# Patient Record
Sex: MALE | Race: WHITE | Employment: UNEMPLOYED | URBAN - NONMETROPOLITAN AREA
[De-identification: names, ages, dates, MRNs, and addresses within clinical notes are randomized per-mention and may not be internally consistent; named-entity substitution may affect disease eponyms.]

---

## 2023-02-26 ENCOUNTER — HOSPITAL ENCOUNTER (INPATIENT)
Age: 23
LOS: 4 days | Discharge: HOME | End: 2023-03-02
Attending: PSYCHIATRY & NEUROLOGY | Admitting: PSYCHIATRY & NEUROLOGY
Payer: MEDICAID

## 2023-02-26 PROBLEM — F33.3 MDD (MAJOR DEPRESSIVE DISORDER), RECURRENT, SEVERE, WITH PSYCHOSIS (HCC): Status: ACTIVE | Noted: 2023-02-26

## 2023-02-26 PROCEDURE — 6370000000 HC RX 637 (ALT 250 FOR IP): Performed by: PSYCHIATRY & NEUROLOGY

## 2023-02-26 PROCEDURE — 1240000000 HC EMOTIONAL WELLNESS R&B

## 2023-02-26 RX ORDER — RISPERIDONE 1 MG/1
1 TABLET ORAL 2 TIMES DAILY
Status: DISCONTINUED | OUTPATIENT
Start: 2023-02-26 | End: 2023-02-27

## 2023-02-26 RX ORDER — MAGNESIUM HYDROXIDE/ALUMINUM HYDROXICE/SIMETHICONE 120; 1200; 1200 MG/30ML; MG/30ML; MG/30ML
30 SUSPENSION ORAL EVERY 6 HOURS PRN
Status: DISCONTINUED | OUTPATIENT
Start: 2023-02-26 | End: 2023-03-02 | Stop reason: HOSPADM

## 2023-02-26 RX ORDER — HYDROXYZINE HYDROCHLORIDE 25 MG/1
50 TABLET, FILM COATED ORAL 3 TIMES DAILY PRN
Status: DISCONTINUED | OUTPATIENT
Start: 2023-02-26 | End: 2023-03-02 | Stop reason: HOSPADM

## 2023-02-26 RX ORDER — TRAZODONE HYDROCHLORIDE 50 MG/1
50 TABLET ORAL NIGHTLY PRN
Status: DISCONTINUED | OUTPATIENT
Start: 2023-02-26 | End: 2023-03-02 | Stop reason: HOSPADM

## 2023-02-26 RX ORDER — IBUPROFEN 400 MG/1
400 TABLET ORAL EVERY 6 HOURS PRN
Status: DISCONTINUED | OUTPATIENT
Start: 2023-02-26 | End: 2023-03-02 | Stop reason: HOSPADM

## 2023-02-26 RX ORDER — ACETAMINOPHEN 325 MG/1
650 TABLET ORAL EVERY 4 HOURS PRN
Status: DISCONTINUED | OUTPATIENT
Start: 2023-02-26 | End: 2023-03-02 | Stop reason: HOSPADM

## 2023-02-26 RX ORDER — VENLAFAXINE HYDROCHLORIDE 37.5 MG/1
37.5 CAPSULE, EXTENDED RELEASE ORAL
Status: DISCONTINUED | OUTPATIENT
Start: 2023-02-27 | End: 2023-03-02 | Stop reason: HOSPADM

## 2023-02-26 RX ORDER — PRAZOSIN HYDROCHLORIDE 1 MG/1
2 CAPSULE ORAL NIGHTLY
Status: DISCONTINUED | OUTPATIENT
Start: 2023-02-26 | End: 2023-03-02 | Stop reason: HOSPADM

## 2023-02-26 RX ADMIN — RISPERIDONE 1 MG: 1 TABLET ORAL at 14:56

## 2023-02-26 RX ADMIN — HYDROXYZINE HYDROCHLORIDE 50 MG: 25 TABLET ORAL at 14:56

## 2023-02-26 ASSESSMENT — PATIENT HEALTH QUESTIONNAIRE - PHQ9: SUM OF ALL RESPONSES TO PHQ QUESTIONS 1-9: 18

## 2023-02-26 ASSESSMENT — SLEEP AND FATIGUE QUESTIONNAIRES
DO YOU USE A SLEEP AID: NO
AVERAGE NUMBER OF SLEEP HOURS: 2
SLEEP PATTERN: DIFFICULTY FALLING ASLEEP;DISTURBED/INTERRUPTED SLEEP;INSOMNIA;NIGHTMARES/TERRORS
DO YOU HAVE DIFFICULTY SLEEPING: YES

## 2023-02-26 ASSESSMENT — PAIN SCALES - GENERAL
PAINLEVEL_OUTOF10: 0
PAINLEVEL_OUTOF10: 0

## 2023-02-26 ASSESSMENT — PAIN - FUNCTIONAL ASSESSMENT: PAIN_FUNCTIONAL_ASSESSMENT: ACTIVITIES ARE NOT PREVENTED

## 2023-02-26 ASSESSMENT — LIFESTYLE VARIABLES
HOW OFTEN DO YOU HAVE A DRINK CONTAINING ALCOHOL: NEVER
HOW MANY STANDARD DRINKS CONTAINING ALCOHOL DO YOU HAVE ON A TYPICAL DAY: PATIENT DOES NOT DRINK

## 2023-02-26 NOTE — PROGRESS NOTES
Behavioral Health   Admission Note   Admission Type: Voluntary    Reason for Admission: \"Increased depression and suicidal ideation. Abusing Cough Syryp at home. Panicked, Paranoidl, Germaphobe. \"    Patient Strengths/Barriers  Strengths (Must Choose Two): Motivation level for treatment, Sense of humor  Barriers: Education, Recreational/leisure/hobbies, Technical/vocation    Addictive Behavior  In the Past 3 Months, Have You Felt or Has Someone Told You That You Have a Problem With  : Other (comment) (Abusing OTC per patient)    Medical Problems:   No past medical history on file. Status EXAM:  Mental Status and Behavioral Exam  Normal: No  Level of Assistance: Independent/Self  Facial Expression: Flat, Sad, Worried  Affect: Congruent  Level of Consciousness: Alert  Frequency of Checks: 4 times per hour, close  Mood:Normal: No  Mood: Anxious, Labile, Sad, Worthless, low self-esteem, Terrified  Motor Activity:Normal: Yes  Motor Activity: Other (comment) (WNL)  Eye Contact: Fair  Observed Behavior: Cooperative, Friendly  Sexual Misconduct History: Current - no  Preception: [place] to person, [place] to time, [place] to place, [place] to situation  Attention:Normal: No  Attention: Hyperalert  Thought Processes: Circumstantial  Thought Content:Normal: No  Thought Content: Paranoia  Depression Symptoms: Crying, Change in energy level, Feelings of hopelessess, Feelings of worthlessness, Feelings of helplessness, Impaired concentration, Increased irritability, Isolative, Loss of interest, Sleep disturbance  Anxiety Symptoms: Generalized, Panic attack  Joy Symptoms: Flight of ideas, Increased energy, Labile, Less need to sleep, Poor judgment, Rapid cycling  Hallucinations: Auditory (comment), Visual (comment), Olfactory (comment) (See\"Re Germs\", Smells Urine, Sees animal shapes, Voices that \" are not his own and noone he knows telling him how to feel or what other people are feeling. \")  Delusions: Yes  Delusions: Paranoid  Memory:Normal: No  Memory: Poor recent, Confabulation  Insight and Judgment: No  Insight and Judgment: Poor judgment, Poor insight    Pt admitted with followings belongings:  Dental Appliances: None  Vision - Corrective Lenses: None  Hearing Aid: None  Jewelry: None  Body Piercings Removed: N/A  Clothing: Shirt, Undergarments, Socks, Pants, Shorts, Footwear  Other Valuables: Other (Comment), Wallet     Admission order obtained Yes  Belongings sent home with Na. Valuables placed in safe in security envelope, number:  na. Patient's home medications were na. Patient oriented to surroundings and program expectations and copy of patient rights given. Received admission packet:  Yes  Consents reviewed, signed Yes. Outcomes Questionnaire completed Yes. \"An Important Message from [place] About Your Rights\" form reviewed, signed: N/A . Patient verbalize understanding: Yes. Patient informed of 15 minute safety monitoring: YES/NO/NA: yes          Patient screened positive for suicide risk on CSSR-S (\"yes\" to question #4, 5, OR 6)  na. Physician notified of risk score na  Constant Observer Orders received N/A .   2 person skin assessment completed upon admission Refused. Explained patients right to have family, representative or physician notified of their admission. Patient has Declined for physician to be notified. Patient has Declined for family/representative to be notified. Provided pt with [employer] handout entitled \"Quitting Smoking. \"  Reviewed handout with pt addressing dangers of smoking, developing coping skills, and providing basic information about quitting. Pt response to counseling:  Refused. Admission summary: Patient admitted to  directly. Patient was transferred to  from [facility]. Patient reports increasing suicidal and homicidal ideation over the last few weeks. Patient reports paranoia and abusing otc cough syrup \"to make the red germs go away. \" Patient reports auditory, visual and olfactory hallucinations. Patient reports that he hears voices telling him how people are feelings and what to feel himself. Patient reports he sees \"red germs\"  and that he smells urine of unspecified type all the time due to fear of the home he lives at being dirty. Patient is FTM transgender. Patient reports being taken out of school in the first grade and having difficulty adjusting to the world due to his limited knowledge base. Patient reports being diagnosed HIV positive in 2017 and not being followed or taking medication due to lack of insurance.            Dev Garcia RN

## 2023-02-26 NOTE — H&P
Department of Psychiatry  Attending History and Physical - Adult         CHIEF COMPLAINT:  Suicidal ideation and worsening psychosis    History obtained from:  patient    HISTORY OF PRESENT ILLNESS:          The patient is a 23 y.o. adult with significant past medical history of PTSD and depression who presents with worsening depression, anxiety attacks, paranoia and active suicidal ideation. Has been struggling with Chronic PTSD symptoms isnce young age. He took prozac and wellbutrin in past. Doneen Dalton off his medications    PSYCHIATRIC HISTORY:      The patient is currently receiving care for the above psychiatric illness. Past mental health outpatient care includes:  1-5 treatment centers    Past mental health hospitalizations: 1 admission    Lifetime Psychiatric Review of Systems         Joy or Hypomania:  no     Panic Attacks:  yes -      Phobias:  yes -      Obsessions and Compulsions:  no     Body or Vocal Tics:  no     Hallucinations:  yes -      Delusions:  yes -     Past psychiatric medications include:  Prpzac and wellbutrin    Adverse reactions from psychotropic medications:  none    Past Medical History:    No past medical history on file. Past Surgical History:    No past surgical history on file. Medications Prior to Admission:   No medications prior to admission. Allergies:  Patient has no allergy information on record. Family History:   No family history on file. Psychiatric Family History  No family history    REVIEW OF SYSTEMS:  CONSTITUTIONAL:  negative  EYES:  negative  HEENT:  negative  RESPIRATORY:  negative  CARDIOVASCULAR:  negative  GASTROINTESTINAL:  negative  GENITOURINARY:  negative  INTEGUMENT/BREAST:  negative  HEMATOLOGIC/LYMPHATIC:  negative    PHYSICAL EXAM:    Vitals: There were no vitals taken for this visit.     Mental Status Examination:  Level of consciousness:  within normal limits  Appearance:  well-appearing  Behavior/Motor:  psychomotor retardation  Attitude toward examiner:  cooperative and attentive  Speech:  slow  Mood:  depressed  Affect:  mood congruent  Thought processes:  linear and goal directed  Thought content:  Homocidal ideation denies  Suicidal Ideation:  active  Delusions:  paranoid  Perceptual Disturbance:  auditory  Cognition:  oriented to person, place, and time  Concentration succeeded  Memory intact  Insight:  impaired  Judgment:  impaired    Cranial Nerve Exam II-XII intact    DSM-IV DIAGNOSIS:    Impression    (Axis I):     PTSD with Psychotic feature   Major depressive disorder; recurrent, severe, and with psychotic features    ASSESSMENT AND PLAN:        Disposition:    Patient to be admitted to the hospital.    Reason for Admission to Psychiatric Unit:  Threat to self requiring 24 hour professional observation    The patient requires intensive 24 level of care for the following reasons:  the need for patient safety    Estimated length of stay:  5-7 days    INITIAL TREATMENT PLAN    Risk Management:  close watch    Medications: Will start Effexor XR 37.5 mg po qdaily, prazosin 5 mg po qhs and risperdal 1 mg po BID    Psychotherapy:  participation in milieu and group      GENERAL PATIENT/FAMILY EDUCATION    Goal:      Patient will understand basic signs and symptoms, Patient will understand benefits/risks and potential side effects from proposed meds, and Patient will understand their role in recovery    Plan:      Verbal explanation of patient's clinical problems in a manner consistent with patient's ability and readiness to except diagnosis. Verbal explanation of working diagnosis, signs and symptoms and recommended course of treatment including risk/benefit information has been given to patient.       Behavioral Services  Medicare Certification Upon Admission     I certify that this patient's inpatient psychiatric hospital admission is medically necessary for:   X (1) Treatment which could reasonably be expected to improve this patient's condition,       X (2) Or for diagnostic study;      AND     X (2) The inpatient psychiatric services are provided while the individual is under the care of a physician and are included in the individualized plan of care. Estimated length of stay/service: Greater than two midnights will be required to reach therapeutic levels of medications and to stabilize mood     Plan for post-hospital care:  Follow up with outpatient psychiatric services        More than 50% of time spent in counseling and care coordination     Time spent 55 minutes

## 2023-02-26 NOTE — GROUP NOTE
Group Therapy Note    Date: 2/26/2023    Group Start Time: 1330  Group End Time: 2697  Group Topic: Healthy Living/Wellness    [facility] Adult Psych 4E    Kizzy Vega LPN        Group Therapy Note    Attendees: 7       Notes:  did not attend    Discipline Responsible: Licensed Practical Nurse      Signature:  Kizzy Vega LPN

## 2023-02-26 NOTE — BH NOTE
INPATIENT RECREATIONAL THERAPY  ADULT BEHAVIORAL SERVICES  EVALUATION    REFERRING PHYSICIAN:  Dr. Oni Escobedo   DIAGNOSIS:   PTSD with psychotic features. PRECAUTIONS:  Standard Precautions   HISTORY OF PRESENT ILLNESS/INJURY: Pt is transgender (female transitioning to male). Pt is admitted to the unit from [facility]. Pt reports increased suicidal and homicidal ideations over the last few weeks and increased paranoia. Pt reports that he has been abusing over the counter cough medicine to make \"red germs go away\" and reports increased hallucinations including hearing voices that tell him how people are feeling and what to feel. He also reports smelling urine most of the time due to fear of living in a dirty home. Per Charmayne Divine RN, pt stated that he had his water shut off over a month ago. Pt lives with a lot of people in his home. Pt has a long hx of PTSD, depression and anxiety. Pt has not been mediation compliant prior to this admission. PMH:  Please see medical chart for prior medical history, allergies, and medication    HISTORY OF PSYCHIATRIC TREATMENT: Pt has a hx of inpatient psychiatric admissions but has not been admitted here on 4E. Pt is currently not linked with an outpatient provider. YOB: 2000  GENDER:  Pt is transgender (female transitioning to male). MARITAL STATUS:  Single- in a relationship  EMPLOYMENT STATUS:  Unemployed   LIVING SITUATION/SUPPORT:  Lives with boyfriend and boyfriends family  EDUCATIONAL LEVEL: Left school in first grade- pulled to be home schooled by mom but never was. MEDICATION/DRUG USE: Pt denies tobacco use, alcohol use, and illicit drug use. Pt has not been medication compliant prior to this admission.      LEISURE INTERESTS:    ACTIVITY PREFERENCE:   ACTIVITY TYPES:    COGNITION: A&Ox4    COPING: Poor   ATTENTION: Fair   RELAXATION: Poor   SELF-ESTEEM: Poor   MOTIVATION:  Fair    SOCIAL SKILLS:  Fair- pt very paranoid at this time   FRUSTRATION TOLERANCE:  No documented hx of violence at this time   ATTENTION SEEKING: No attention seeking behaviors observed at this time   COOPERATION: Pt cooperative   AFFECT: Blunt  APPEARANCE: Pt displays fair grooming and hygiene and is currently dressed in hospital scrub attire    HEARING:  No impairments noted at this time  VISION:   No impairments noted at this time   VERBAL COMMUNICATION:  No impairments noted at this time   WRITTEN COMMUNICATION:  Pt has limited education    COORDINATION:  No impairments noted at this time   MOBILITY:  Pt ambulates independently    GOALS: Pt to increase socialization and knowledge of coping skills through participation in group therapy sessions following verbal encouragement from staff.

## 2023-02-26 NOTE — PROGRESS NOTES
02/26/23 1550   Encounter Summary   Encounter Overview/Reason  Behavioral Health   Service Provided For: Patient   Referral/Consult From: Other (comment)  (Spirituality Group)   Last Encounter  02/26/23   Complexity of Encounter Low   Begin Time 1515   End Time  1545   Total Time Calculated 30 min   Behavioral Health    Type  Spirituality Group     PatientNamrata" attended and participated in spirituality group with  and other patients. [address] [place] [address]. [address] [place] [address] Rene Dawson, [address]  [phone]

## 2023-02-27 PROCEDURE — APPSS30 APP SPLIT SHARED TIME 16-30 MINUTES: Performed by: PHYSICIAN ASSISTANT

## 2023-02-27 PROCEDURE — 1240000000 HC EMOTIONAL WELLNESS R&B

## 2023-02-27 PROCEDURE — 6370000000 HC RX 637 (ALT 250 FOR IP): Performed by: PHYSICIAN ASSISTANT

## 2023-02-27 PROCEDURE — 6370000000 HC RX 637 (ALT 250 FOR IP): Performed by: PSYCHIATRY & NEUROLOGY

## 2023-02-27 RX ADMIN — VENLAFAXINE HYDROCHLORIDE 37.5 MG: 37.5 CAPSULE, EXTENDED RELEASE ORAL at 08:35

## 2023-02-27 RX ADMIN — HYDROXYZINE HYDROCHLORIDE 50 MG: 25 TABLET ORAL at 21:22

## 2023-02-27 RX ADMIN — PRAZOSIN HYDROCHLORIDE 2 MG: 1 CAPSULE ORAL at 21:22

## 2023-02-27 RX ADMIN — TRAZODONE HYDROCHLORIDE 50 MG: 50 TABLET ORAL at 21:22

## 2023-02-27 RX ADMIN — HYDROXYZINE HYDROCHLORIDE 50 MG: 25 TABLET ORAL at 08:35

## 2023-02-27 RX ADMIN — HYDROXYZINE HYDROCHLORIDE 50 MG: 25 TABLET ORAL at 13:25

## 2023-02-27 RX ADMIN — RISPERIDONE 1.5 MG: 0.25 TABLET, FILM COATED ORAL at 21:21

## 2023-02-27 RX ADMIN — RISPERIDONE 1 MG: 1 TABLET ORAL at 08:35

## 2023-02-27 ASSESSMENT — PAIN SCALES - GENERAL
PAINLEVEL_OUTOF10: 0
PAINLEVEL_OUTOF10: 0

## 2023-02-27 ASSESSMENT — PAIN - FUNCTIONAL ASSESSMENT: PAIN_FUNCTIONAL_ASSESSMENT: ACTIVITIES ARE NOT PREVENTED

## 2023-02-27 NOTE — PROGRESS NOTES
Department of Psychiatry  Progress Note     Chief Complaint:  suicidal ideation, homicidal ideation, psychosis     PROGRESS:  [address] Emiliano Renny, who prefers to be called Shakeel Finn, presents to the interview room. He states he is doing okay today. Reports he was admitted due to paranoia and intrusive thoughts of hurting people and himself. He states the suicidal or homicidal thoughts have been hard to control lately. He says he feels so powerless to his mental health. He also reports he has been seeing and hearing things. The voices give him commands to feel a certain way about himself and other people. He also reports the voices have been giving him commands to harm himself or others. He states the voices have basically been telling him \"hurt that person before they hurt you. \"  He reports he does not want to hurt anyone. He specifically mentions his fiancée, cousin in law and sister-in-law. He is not sure where these homicidal thoughts are coming from. States he just pop into his head. He states he was having suicidal thoughts because \"if I am dead I cannot hurt anyone I care about. \"  He also reported that he was feeling very paranoid at home. States he lives with a 77year old grandmother and was paranoid that she was going to attack him. Shakeel Finn reports his mood is okay today. He rates his mood 7 out of 10 with 10 being the best.  He continues to feel depressed and anxious. He is tearful throughout the interview. He endorses passive suicidal thoughts but denies any specific plan or intent at this time. He has had a contract for safety on the unit. He denies any active homicidal ideation. He does continue to feel paranoid. He states he knows the nurses are nice and care about him but he feels that they are trying to hurt him. He reports auditory hallucinations are less frequent. He feels the medication has been helping with this.   He states after he takes his medication the feeling that everyone is out to get him goes away. He denies any command hallucinations to harm himself or others today. He does endorse having visual hallucinations of \"sparkling creatures. \"  He described it to his nurse Ember Colunga this morning as like the long sparklers people use. He states at times, he cannot see them but he \"knows they are around the corner. \"  He slept really good last night. Staff reported he slept 8.5 hours continuous. Appetite has been good. He has been compliant with his medications and denies any side effects. He has been out the unit coloring, interacting with peers and attending groups. Suicidal ideations: Passive without current plan or intent   compliance with medications: good   Medication side effects: absent  ROS: Patient has new complaints:  no  Sleep quality: 8.5 hours continuous last night per staff  Attending groups: yes      OBJECTIVE      Medications  Current Facility-Administered Medications: acetaminophen (TYLENOL) tablet 650 mg, 650 mg, Oral, Q4H PRN  ibuprofen (ADVIL;MOTRIN) tablet 400 mg, 400 mg, Oral, Q6H PRN  magnesium hydroxide (MILK OF MAGNESIA) 400 MG/5ML suspension 30 mL, 30 mL, Oral, Daily PRN  aluminum & magnesium hydroxide-simethicone (MAALOX) 200-200-20 MG/5ML suspension 30 mL, 30 mL, Oral, Q6H PRN  hydrOXYzine HCl (ATARAX) tablet 50 mg, 50 mg, Oral, TID PRN  traZODone (DESYREL) tablet 50 mg, 50 mg, Oral, Nightly PRN  risperiDONE (RISPERDAL) tablet 1 mg, 1 mg, Oral, BID  prazosin (MINIPRESS) capsule 2 mg, 2 mg, Oral, Nightly  venlafaxine (EFFEXOR XR) extended release capsule 37.5 mg, 37.5 mg, Oral, Daily with breakfast     Physical     height is 5' 6\" (1.676 m) and weight is 110 lb (49.9 kg). His oral temperature is 98.6 °F (37 °C). His blood pressure is 121/74 and his pulse is 86. His respiration is 16 and oxygen saturation is 100%.    No results found for: WBC, HGB, HCT, PLT, CHOL, TRIG, HDL, LDLDIRECT, ALT, AST, NA, K, CL, CREATININE, BUN, CO2, TSH, PSA, INR, GLUF, LABA1C, LABMICR Mental Status Exam:   Level of consciousness:  awake  Appearance:  well-appearing, hospital attire, in chair, good grooming, and good hygiene  Behavior/Motor: Tearful at times  Attitude toward examiner:  cooperative, attentive, and good eye contact  Speech:  spontaneous, normal rate, and normal volume  Mood: Dysphoric  Affect:  blunted  Thought processes:  linear, goal directed, and coherent  Thought content:  Denies homicidal ideation  Suicidal Ideation:  passive, without plan, and without intent  Delusions:  paranoid and persecutory  Perceptual Disturbance:  auditory noncommand. Visual hallucinations of \"sparkling creatures\"  Cognition: Patient is oriented to person, place, time and situation  Concentration: clinically adequate  Memory: intact  Insight & Judgement: fair       ASSESSMENT    Major depressive disorder, recurrent, severe with psychotic features  Anxiety unspecified  PTSD per history    PLAN    Patient's symptoms show minimal improvement today  Medication adjustments as discussed with the attending physician: Continue to titrate Risperdal  Side effects and risks versus benefits of medications were discussed with the patient   Attempt to develop insight, psycho-education and supportive therapy conducted. Probable discharge: To be determined  Follow-up: [facility] outpatient, daily while on inpatient unit    Electronically signed by Alexandria Del Real PA-C on 2/27/2023 at 1:02 PM Reviewed patient's current plan of care and vital signs with nursing staff. **This report has been created using voice recognition software. It may contain minor errors which are inherent in voice recognition technology. **                                        Psychiatry Attending Attestation     I assessed this patient and reviewed the case and plan of care with Alexandria Del Real PA-C. I have reviewed the above documentation and I agree with the findings and treatment plan with the following updates.   Michelle Ly reports that he continues to feel sad down and low. Reports that he is fearful of hurting his friends and family members before they can hurt him. Mentions that he has constant thoughts that people will hurt him and is also having some commanding voices. Reports that these voices are very infrequent however they continue to be intense when he hears them. Reports feeling sad down and low. Reports that suicidal thoughts still cross his mind. Tolerating medications well and notes that Risperdal has been helpful with his paranoia and voices. Discussed with him about continue to titrate Risperdal and he is agreeable to the plan. PLAN  Patient s symptoms   are improving  Will continue to tittrate Risperdal  Attempt to develop insight  Psycho-education conducted. Supportive Therapy conducted. Probable discharge is tbd  Follow-up tbd    More than 16 mins of the session was spent doing Supportive psychotherapy and coordinating care. Session lasted for over 30 mins. Patient was evaluated by Nina Hall PA-C on the unit in person and I evaluated patient as Tele visit. This Virtual Visit was conducted with patient's consent. The patient is located in a state where I am licensed to provide care. Be Wright is a 23 y.o. adult being evaluated by a Virtual Visit (video visit) encounter to address concerns as mentioned above. A caregiver was present in the room along with the patient. Patient is present at [address] [place] [address] and I am physically present at my home in [facility]     --Arabella Hanks MD on 2/27/2023 at 2:25 PM    An electronic signature was used to authenticate this note. **This report has been created using voice recognition software. It may contain minor errors which are inherent in voice recognition technology. **

## 2023-02-27 NOTE — PROGRESS NOTES
Discharge planning-Darin is to go to [facility] for a walk in intake. Check in times are on Monday and Wednesday morning at 0830 am. It is a first come first serve time slot.

## 2023-02-27 NOTE — PATIENT CARE CONFERENCE
[facility]  Initial Interdisciplinary Treatment Plan NOTE    REVIEW DATE AND TIME: 2/27/23 9:30    PATIENT was IN TREATMENT TEAM.  See Multidisciplinary Treatment Team sheet for participants. ADMISSION TYPE:   Admission Type: Voluntary    REASON FOR ADMISSION:  Reason for Admission: \"Increased depression and suicidal ideation. Abusing Cough Syryp at home. Panicked, Paranoidl, Germaphobe. \"      Estimated Length of Stay Update:  3-5 days  Estimated Discharge Date Update: 3-5 days    Patient Strengths/Barriers  Strengths (Must Choose Two): Motivation level for treatment, Sense of humor  Barriers: Education, Recreational/leisure/hobbies, Technical/vocation  Addictive Behavior:Addictive Behavior  In the Past 3 Months, Have You Felt or Has Someone Told You That You Have a Problem With  : Other (comment) (Abusing OTC per patient)  Medical Problems:No past medical history on file. EDUCATION:   Learner Progress Toward Treatment Goals: Reviewed results and recommendations of this team, Reviewed group plan and strategies, Reviewed signs, symptoms and risk of self harm and violent behavior, and Reviewed goals and plan of care    Method: Individual    Outcome: Verbalized understanding and Demonstrated Understanding    PATIENT GOALS: Get on the right medications     OQ TOP QUALITY PRIORITIES FOR THE PATIENT AS IDENTIFIED ON ADMISSION ADMINISTRATION:        76- suicide, substance abuse     PLAN/TREATMENT RECOMMENDATIONS UPDATE:   What is the most important thing we can help you with while you are here? See above  Who is your support system? Fiance  Do you have follow-up providers? No, will need connected   Do you have the ability to pay for your medications? No, no insurance currently   Where will you be residing when you leave the hospital? With fiance at mother-in laws home  Will need a return to work slip or FMLA paper completion?  No      GOALS UPDATE:   Time frame for Short-Term Goals: Daily    Cameron Obregon Mirna Dupree

## 2023-02-27 NOTE — PLAN OF CARE
Problem: Safety - Adult  Goal: Free from fall injury  2/27/2023 1045 by Gerri Adkins RN  Outcome: Progressing  Note: No falls were observed or reported so far this shift, gait steady when ambulating and wears non-skid slippers socks. Encourage patient to wear shower shoes while in the shower. Remains on fall precautions. 2/26/2023 2227 by Inocencio Rhodes LPN  Outcome: Progressing  Note: Pt had remained free from fall so far this shift. Problem: Pain  Goal: Verbalizes/displays adequate comfort level or baseline comfort level  2/27/2023 1045 by Gerri Adkins RN  Outcome: Progressing  Flowsheets (Taken 2/27/2023 0800)  Verbalizes/displays adequate comfort level or baseline comfort level: Encourage patient to monitor pain and request assistance  Note: Patient denies pain this shift. 2/26/2023 2227 by Inocencio Rhodes LPN  Outcome: Progressing  Flowsheets (Taken 2/26/2023 2205)  Verbalizes/displays adequate comfort level or baseline comfort level:   Encourage patient to monitor pain and request assistance   Assess pain using appropriate pain scale  Note: Pt denies pain at this time. Problem: Genitourinary - Adult  Goal: Absence of urinary retention  2/27/2023 1045 by Gerri Adkins RN  Outcome: Progressing  Flowsheets  Taken 2/27/2023 1045  Absence of urinary retention: Assess patients ability to void and empty bladder  Taken 2/27/2023 0953  Absence of urinary retention: Assess patients ability to void and empty bladder  Note: No further issues reported. 2/26/2023 2227 by Inocencio Rhodes LPN  Outcome: Progressing  Flowsheets (Taken 2/26/2023 2205)  Absence of urinary retention:   Assess patients ability to void and empty bladder   Monitor intake/output and perform bladder scan as needed     Problem: Anxiety  Goal: Will report anxiety at manageable levels  Description: INTERVENTIONS:  1. Administer medication as ordered  2. Teach and rehearse alternative coping skills  3.  Provide emotional support with 1:1 interaction with staff  2/27/2023 1045 by oYdit Michael RN  Outcome: Progressing  Flowsheets (Taken 2/27/2023 2298)  Will report anxiety at manageable levels: Administer medication as ordered  Note: Patient reports anxiety. Pt taking Atarax prn. Verbalized medication was effective. 2/26/2023 2227 by Kavita Enriquez LPN  Outcome: Progressing  Note: Pt reports anxiety at this time. Problem: Change in Body Image  Goal: Pt/Family communicate acceptance of loss or change in body image and feel psychological comfort and peace  Description: INTERVENTIONS:  1. Assess patient/family anxiety and grief process related to change in body image, loss of functional status, loss of sense of self, and forgiveness  2. Provide emotional and spiritual support  3. Provide information about the patient's health status with consideration of family and cultural values  4. Communicate willingness to discuss loss and facilitate grief process with patient/family as appropriate  5. Emphasize sustaining relationships within family system and community, or carlotta/spiritual traditions  6. Initiate Spiritual Care, Psychosocial Clinical Specialist consult as needed  2/27/2023 1045 by Yodit Michael, RN  Outcome: Progressing  Flowsheets  Taken 2/27/2023 1045  Patient/family communicate acceptance of loss or change in body image and feel psychological comfort and peace: Provide emotional and spiritual support  Taken 2/27/2023 9321  Patient/family communicate acceptance of loss or change in body image and feel psychological comfort and peace: Provide emotional and spiritual support  Note: No problems reported this shift with body image.    2/26/2023 2227 by Kavita Enriquez LPN  Outcome: Progressing  Flowsheets (Taken 2/26/2023 2227)  Patient/family communicate acceptance of loss or change in body image and feel psychological comfort and peace:   Assess patient/family anxiety and grief process related to change in body image, loss of functional status, loss of sense of self, and forgiveness   Provide emotional and spiritual support     Problem: Decision Making  Goal: Pt/Family able to effectively weigh alternatives and participate in decision making related to treatment and care  Description: INTERVENTIONS:  1. Determine when there are differences between patient's view, family's view, and healthcare provider's view of condition  2. Facilitate patient and family articulation of goals for care  3. Help patient and family identify pros/cons of alternative solutions  4. Provide information as requested by patient/family  5. Respect patient/family right to receive or not to receive information  6. Serve as a liaison between patient and family and health care team  7. Initiate Consults from Ethics, Palliative Care or initiate [address] [facility] [address] as is appropriate  2/27/2023 1045 by Sri Ross RN  Outcome: Progressing  Flowsheets (Taken 2/27/2023 2330)  Patient/family able to effectively weigh alternatives and participate in decision making related to treatment and care: Provide information as requested by patient/family  Note: Patient participates in decision making. 2/26/2023 2227 by Lolis Perry LPN  Outcome: Progressing  Flowsheets (Taken 2/26/2023 2227)  Patient/family able to effectively weigh alternatives and participate in decision making related to treatment and care: Determine when there are differences between patient's view, family's view, and healthcare provider's view of condition     Problem: Behavior  Goal: Pt/Family maintain appropriate behavior and adhere to behavioral management agreement, if implemented  Description: INTERVENTIONS:  1. Assess patient/family's coping skills and  non-compliant behavior (including use of illegal substances)  2. Notify security of behavior or suspected illegal substances which indicate the need for search of the family and/or belongings  3.  Encourage verbalization of thoughts and concerns in a socially appropriate manner  4. Utilize positive, consistent limit setting strategies supporting safety of patient, staff and others  5. Encourage participation in the decision making process about the behavioral management agreement  6. If a visitor's behavior poses a threat to safety call refer to organization policy. 7. Initiate consult with , Psychosocial CNS, Spiritual Care as appropriate  2/27/2023 1045 by Paul Jenkins, RN  Outcome: Progressing  Flowsheets (Taken 2/27/2023 9877)  Patient/family maintains appropriate behavior and adheres to behavioral management agreement, if implemented: Assess patient/familys coping skills and  non-compliant behavior (including use of illegal substances)  Note: Patient maintained appropriate behaviors this shift. 2/26/2023 2227 by Stas Whyte LPN  Outcome: Progressing  Flowsheets (Taken 2/26/2023 2227)  Patient/family maintains appropriate behavior and adheres to behavioral management agreement, if implemented:   Assess patient/familys coping skills and  non-compliant behavior (including use of illegal substances)   Notify security of behavior or suspected illegal substances which indicate the need for search of the patient and/or belongings  Note: Maintained appropriate behaviors. Problem: Depression/Self Harm  Goal: Effect of psychiatric condition will be minimized and patient will be protected from self harm  Description: INTERVENTIONS:  1. Assess impact of patient's symptoms on level of functioning, self care needs and offer support as indicated  2. Assess patient/family knowledge of depression, impact on illness and need for teaching  3. Provide emotional support, presence and reassurance  4. Assess for possible suicidal thoughts or ideation. If patient expresses suicidal thoughts or statements do not leave alone, initiate Suicide Precautions, move to a room close to the nursing station and obtain sitter  5.  Initiate consults as appropriate with Mental Health Professional, Spiritual Care, Psychosocial CNS, and consider a recommendation to the [facility]IP for a Psychiatric Consultation  2/27/2023 1045 by Tigist Zacarias RN  Outcome: Progressing  Flowsheets (Taken 2/27/2023 5035)  Effect of psychiatric condition will be minimized and patient will be protected from self harm: Provide emotional support, presence and reassurance  Note: Patient reports mood 7/10 with 10 being normal. Has blunt but brightens affect. Speech clear and relevant. Good eye contact. Reports hope for future and identifies fiance as their support system. Patient reports depression at present time. Patient reports hallucinations as \"sparkling creatures\". Reports that they are not as bad as before but that they are hiding behind walls but he still knows that they are there. Reports feeling paranoid that people are going to hurt him. 2/26/2023 2227 by Ryan Kemp LPN  Outcome: Progressing  Flowsheets (Taken 2/26/2023 2227)  Effect of psychiatric condition will be minimized and patient will be protected from self harm:   Assess impact of patients symptoms on level of functioning, self care needs and offer support as indicated   Assess patient/family knowledge of depression, impact on illness and need for teaching  Note: Pt remains protected from self-harm. Care plan reviewed with patient. Patient verbalize understanding of the plan of care and contribute to goal setting.

## 2023-02-27 NOTE — PLAN OF CARE
Problem: Safety - Adult  Goal: Free from fall injury  Outcome: Progressing  Note: Pt had remained free from fall so far this shift. Problem: Pain  Goal: Verbalizes/displays adequate comfort level or baseline comfort level  Outcome: Progressing  Flowsheets (Taken 2/26/2023 2205)  Verbalizes/displays adequate comfort level or baseline comfort level:   Encourage patient to monitor pain and request assistance   Assess pain using appropriate pain scale  Note: Pt denies pain at this time. Problem: Genitourinary - Adult  Goal: Absence of urinary retention  Outcome: Progressing  Flowsheets (Taken 2/26/2023 2205)  Absence of urinary retention:   Assess patients ability to void and empty bladder   Monitor intake/output and perform bladder scan as needed     Problem: Anxiety  Goal: Will report anxiety at manageable levels  Description: INTERVENTIONS:  1. Administer medication as ordered  2. Teach and rehearse alternative coping skills  3. Provide emotional support with 1:1 interaction with staff  Outcome: Progressing  Note: Pt reports anxiety at this time. Problem: Change in Body Image  Goal: Pt/Family communicate acceptance of loss or change in body image and feel psychological comfort and peace  Description: INTERVENTIONS:  1. Assess patient/family anxiety and grief process related to change in body image, loss of functional status, loss of sense of self, and forgiveness  2. Provide emotional and spiritual support  3. Provide information about the patient's health status with consideration of family and cultural values  4. Communicate willingness to discuss loss and facilitate grief process with patient/family as appropriate  5. Emphasize sustaining relationships within family system and community, or carlotta/spiritual traditions  6.  Initiate Spiritual Care, Psychosocial Clinical Specialist consult as needed  Outcome: Progressing  Flowsheets (Taken 2/26/2023 2227)  Patient/family communicate acceptance of loss or change in body image and feel psychological comfort and peace:   Assess patient/family anxiety and grief process related to change in body image, loss of functional status, loss of sense of self, and forgiveness   Provide emotional and spiritual support     Problem: Decision Making  Goal: Pt/Family able to effectively weigh alternatives and participate in decision making related to treatment and care  Description: INTERVENTIONS:  1. Determine when there are differences between patient's view, family's view, and healthcare provider's view of condition  2. Facilitate patient and family articulation of goals for care  3. Help patient and family identify pros/cons of alternative solutions  4. Provide information as requested by patient/family  5. Respect patient/family right to receive or not to receive information  6. Serve as a liaison between patient and family and health care team  7. Initiate Consults from Ethics, Palliative Care or initiate [address] [facility] as is appropriate  Outcome: Progressing  Flowsheets (Taken 2/26/2023 2227)  Patient/family able to effectively weigh alternatives and participate in decision making related to treatment and care: Determine when there are differences between patient's view, family's view, and healthcare provider's view of condition     Problem: Behavior  Goal: Pt/Family maintain appropriate behavior and adhere to behavioral management agreement, if implemented  Description: INTERVENTIONS:  1. Assess patient/family's coping skills and  non-compliant behavior (including use of illegal substances)  2. Notify security of behavior or suspected illegal substances which indicate the need for search of the family and/or belongings  3. Encourage verbalization of thoughts and concerns in a socially appropriate manner  4. Utilize positive, consistent limit setting strategies supporting safety of patient, staff and others  5.  Encourage participation in the decision making process about the behavioral management agreement  6. If a visitor's behavior poses a threat to safety call refer to organization policy. 7. Initiate consult with , Psychosocial CNS, Spiritual Care as appropriate  Outcome: Progressing  Flowsheets (Taken 2/26/2023 2227)  Patient/family maintains appropriate behavior and adheres to behavioral management agreement, if implemented:   Assess patient/familys coping skills and  non-compliant behavior (including use of illegal substances)   Notify security of behavior or suspected illegal substances which indicate the need for search of the patient and/or belongings  Note: Maintained appropriate behaviors. Problem: Depression/Self Harm  Goal: Effect of psychiatric condition will be minimized and patient will be protected from self harm  Description: INTERVENTIONS:  1. Assess impact of patient's symptoms on level of functioning, self care needs and offer support as indicated  2. Assess patient/family knowledge of depression, impact on illness and need for teaching  3. Provide emotional support, presence and reassurance  4. Assess for possible suicidal thoughts or ideation. If patient expresses suicidal thoughts or statements do not leave alone, initiate Suicide Precautions, move to a room close to the nursing station and obtain sitter  5. Initiate consults as appropriate with Mental Health Professional, Spiritual Care, Psychosocial CNS, and consider a recommendation to the LIP for a Psychiatric Consultation  Outcome: Progressing  Flowsheets (Taken 2/26/2023 2227)  Effect of psychiatric condition will be minimized and patient will be protected from self harm:   Assess impact of patients symptoms on level of functioning, self care needs and offer support as indicated   Assess patient/family knowledge of depression, impact on illness and need for teaching  Note: Pt remains protected from self-harm.         Care plan reviewed with patient and does verbalize understanding of the plan of care and contribute to goal setting.

## 2023-02-27 NOTE — PROGRESS NOTES
Behavioral Services  Medicare Certification Upon Admission    I certify that this patient's inpatient psychiatric hospital admission is medically necessary for:    [x] (1) Treatment which could reasonably be expected to improve this patient's condition,       [x] (2) Or for diagnostic study;     AND     [x](2) The inpatient psychiatric services are provided while the individual is under the care of a physician and are included in the individualized plan of care.     Estimated length of stay/service 2-3 days    Plan for post-hospital care hc    Electronically signed by Bessy Perez MD on 2/27/2023 at 9:16 AM

## 2023-02-27 NOTE — PROGRESS NOTES
Psychosocial Assessment    Current Level of Psychosocial Functioning     Independent X  Dependent    Minimal Assist     Comments:      Psychosocial High Risk Factors (check all that apply)    Unable to obtain meds X (no insurance)  Chronic illness/pain  X  Substance abuse X  Lack of Family Support   Financial stress X  Isolation   Inadequate Community Resources  Suicide attempt(s) X  Not taking medications X  Victim of crime   Developmental Delay  Unable to manage personal needs    Age 70 or older   Homeless  No transportation X  Readmission within 30 days  Unemployment X  Traumatic Event    Family/Supports identified: Fiance    Sexual Orientation:  heterosexual    Patient Strengths: Motivation level for treatment    Patient Barriers: No transportation, recent job loss, and no active insurance    Safety plan: On-going, Q15 minute safety checks    CMHC/MH history: See clinical summary for details    Plan of Care:  medication management, group/individual therapies, family meetings, psycho -education, treatment team meetings to assist with stabilization    Initial Discharge Plan:  : Patient will return to home and follow-up outpatient. Clinical Summary:      Patient admitted to [facility] voluntarily from [facility]. Pt admitted due to paranoia, suicidal and homicidal thoughts. Pt reports abusing otc cough syrup. Pt currently resides with his fiance at their mother-in-laws home. Pt is not active with a provider and not on current medications. Pt reports a recent job loss as well as not having active medical insurance. Pt is motivated for treatment. Patient is FTM transgender.

## 2023-02-27 NOTE — PLAN OF CARE
Patient has attended at least one group today and has been out of his room to socialize with others this shift so he has been able to demonstrate effective coping strategies at this time.

## 2023-02-27 NOTE — PROGRESS NOTES
Group Therapy Note    Date: 2/27/2023  Start Time: 1400  End Time:  1430  Number of Participants: 7    Type of Group: Psychotherapy      Notes:  Group began 30 minutes late due to there being an ongoing disposition issues with another pt. Pt is present for group with active participation. Peers discussed the phrase, The Attitude is the father of the action. Peers explored personal belief and explored their own self sabotaging behaviors. Peers were assisted and chalange in confronting self imposed barriers of thought, what if, fear of change and the unknown etc.     Status After Intervention:  Improved    Participation Level: Active Listener and Interactive    Participation Quality: Appropriate, Attentive, Sharing, and Supportive      Speech:  normal      Thought Process/Content: Logical  Linear      Affective Functioning: Congruent      Mood: euthymic      Level of consciousness:  Alert, Oriented x4, and Attentive      Response to Learning: Able to verbalize current knowledge/experience, Able to verbalize/acknowledge new learning, Able to retain information, Capable of insight, Able to change behavior, and Progressing to goal      Endings: None Reported    Modes of Intervention: Education, Support, Socialization, Exploration, Clarifying, Problem-solving, and Activity      Discipline Responsible: /Counselor      Signature:  CECIL Gardner

## 2023-02-27 NOTE — PROGRESS NOTES
WILLOW has reviewed and agrees with HUI Hernandez LPN's shift   Assessment.     Santy Joy RN  2/27/2023

## 2023-02-28 PROCEDURE — 6370000000 HC RX 637 (ALT 250 FOR IP): Performed by: PHYSICIAN ASSISTANT

## 2023-02-28 PROCEDURE — APPSS30 APP SPLIT SHARED TIME 16-30 MINUTES: Performed by: PHYSICIAN ASSISTANT

## 2023-02-28 PROCEDURE — 6370000000 HC RX 637 (ALT 250 FOR IP): Performed by: PSYCHIATRY & NEUROLOGY

## 2023-02-28 PROCEDURE — 1240000000 HC EMOTIONAL WELLNESS R&B

## 2023-02-28 RX ADMIN — VENLAFAXINE HYDROCHLORIDE 37.5 MG: 37.5 CAPSULE, EXTENDED RELEASE ORAL at 07:54

## 2023-02-28 RX ADMIN — RISPERIDONE 1.5 MG: 0.25 TABLET, FILM COATED ORAL at 21:06

## 2023-02-28 RX ADMIN — HYDROXYZINE HYDROCHLORIDE 50 MG: 25 TABLET ORAL at 15:21

## 2023-02-28 RX ADMIN — RISPERIDONE 1.5 MG: 0.25 TABLET, FILM COATED ORAL at 07:54

## 2023-02-28 RX ADMIN — TRAZODONE HYDROCHLORIDE 50 MG: 50 TABLET ORAL at 21:06

## 2023-02-28 RX ADMIN — PRAZOSIN HYDROCHLORIDE 2 MG: 1 CAPSULE ORAL at 21:06

## 2023-02-28 RX ADMIN — HYDROXYZINE HYDROCHLORIDE 50 MG: 25 TABLET ORAL at 07:54

## 2023-02-28 ASSESSMENT — PAIN SCALES - GENERAL
PAINLEVEL_OUTOF10: 0
PAINLEVEL_OUTOF10: 0

## 2023-02-28 NOTE — PLAN OF CARE
Problem: Safety - Adult  Goal: Free from fall injury  2/28/2023 1020 by Brittany Jhonson LPN  Outcome: Progressing  Note: Patient remained free from falls  2/28/2023 0042 by Jigar Jaeger RN  Outcome: Progressing  Note: Remained free from falls this shift. 15 minute safety checks are being completed throughout shift. Problem: Pain  Goal: Verbalizes/displays adequate comfort level or baseline comfort level  2/28/2023 1020 by Brittany Johnson LPN  Outcome: Progressing  Note: Denies pain at this time, will monitor  2/28/2023 0042 by Jigar Jaeger RN  Outcome: Progressing  Flowsheets (Taken 2/28/2023 4849)  Verbalizes/displays adequate comfort level or baseline comfort level: Assess pain using appropriate pain scale  Note: Denies pain this shift. Problem: Genitourinary - Adult  Goal: Absence of urinary retention  2/28/2023 1020 by Brittany Johnson LPN  Outcome: Progressing  Note: Did not have any complaints  2/28/2023 0042 by Jigar Jaeger RN  Outcome: Progressing  Flowsheets (Taken 2/27/2023 1045 by Gucci Bill RN)  Absence of urinary retention: Assess patients ability to void and empty bladder  Note: No complaints of urinary retention this shift. Problem: Anxiety  Goal: Will report anxiety at manageable levels  Description: INTERVENTIONS:  1. Administer medication as ordered  2. Teach and rehearse alternative coping skills  3. Provide emotional support with 1:1 interaction with staff  2/28/2023 1020 by Brittany Johnson LPN  Outcome: Progressing  Note: Medication given for anxiety, see OLIMP[facility]  2/28/2023 0042 by Jigar Jaeger RN  Outcome: Progressing  Flowsheets (Taken 2/28/2023 6542)  Will report anxiety at manageable levels:   Administer medication as ordered   Teach and rehearse alternative coping skills   Provide emotional support with 1:1 interaction with staff  Note: Continues to have anxiety this shift requested atarax with relief.       Problem: Change in Body Image  Goal: Pt/Family communicate acceptance of loss or change in body image and feel psychological comfort and peace  Description: INTERVENTIONS:  1. Assess patient/family anxiety and grief process related to change in body image, loss of functional status, loss of sense of self, and forgiveness  2. Provide emotional and spiritual support  3. Provide information about the patient's health status with consideration of family and cultural values  4. Communicate willingness to discuss loss and facilitate grief process with patient/family as appropriate  5. Emphasize sustaining relationships within family system and community, or [place]/spiritual traditions  6. Initiate Spiritual Care, Psychosocial Clinical Specialist consult as needed  2/28/2023 1020 by Morena Mancuso LPN  Outcome: Progressing  2/28/2023 0042 by Bryan Sellers RN  Outcome: Progressing  Flowsheets (Taken 2/27/2023 1045 by Yodit Michael RN)  Patient/family communicate acceptance of loss or change in body image and feel psychological comfort and peace: Provide emotional and spiritual support     Problem: Decision Making  Goal: Pt/Family able to effectively weigh alternatives and participate in decision making related to treatment and care  Description: INTERVENTIONS:  1. Determine when there are differences between patient's view, family's view, and healthcare provider's view of condition  2. Facilitate patient and family articulation of goals for care  3. Help patient and family identify pros/cons of alternative solutions  4. Provide information as requested by patient/family  5. Respect patient/family right to receive or not to receive information  6. Serve as a liaison between patient and family and health care team  7.  Initiate Consults from Ethics, Palliative Care or initiate [address] as is appropriate  2/28/2023 1020 by Morena Mancuso LPN  Outcome: Progressing  Note: Patient making decisions for treatment and care  2/28/2023 0042 by Bryan Sellers RN  Outcome: Progressing  Flowsheets (Taken 2/27/2023 0953 by Eddie Tai, RN)  Patient/family able to effectively weigh alternatives and participate in decision making related to treatment and care: Provide information as requested by patient/family     Problem: Behavior  Goal: Pt/Family maintain appropriate behavior and adhere to behavioral management agreement, if implemented  Description: INTERVENTIONS:  1. Assess patient/family's coping skills and  non-compliant behavior (including use of illegal substances)  2. Notify security of behavior or suspected illegal substances which indicate the need for search of the family and/or belongings  3. Encourage verbalization of thoughts and concerns in a socially appropriate manner  4. Utilize positive, consistent limit setting strategies supporting safety of patient, staff and others  5. Encourage participation in the decision making process about the behavioral management agreement  6. If a visitor's behavior poses a threat to safety call refer to organization policy. 7. Initiate consult with , Psychosocial CNS, Spiritual Care as appropriate  2/28/2023 1020 by Eric Palomino LPN  Outcome: Progressing  Note: Appropriate behaviors maintained  2/28/2023 0042 by Stanley Mcknight RN  Outcome: Progressing  [address] (Taken 2/27/2023 6099 by Eddie Tai, RN)  Patient/family maintains appropriate behavior and adheres to behavioral management agreement, if implemented: Assess patient/familys coping skills and  non-compliant behavior (including use of illegal substances)     Problem: Depression/Self Harm  Goal: Effect of psychiatric condition will be minimized and patient will be protected from self harm  Description: INTERVENTIONS:  1. Assess impact of patient's symptoms on level of functioning, self care needs and offer support as indicated  2. Assess patient/family knowledge of depression, impact on illness and need for teaching  3.  Provide emotional support, presence and reassurance  4. Assess for possible suicidal thoughts or ideation. If patient expresses suicidal thoughts or statements do not leave alone, initiate Suicide Precautions, move to a room close to the nursing station and obtain sitter  5. Initiate consults as appropriate with Mental Health Professional, Spiritual Care, Psychosocial CNS, and consider a recommendation to the [facility] for a Psychiatric Consultation  2/28/2023 1020 by Kiki Piper LPN  Outcome: Progressing  Note: Patient protected from self harm  2/28/2023 0042 by Chio Vazquez RN  Outcome: Progressing  Flowsheets (Taken 2/27/2023 1810 by Shayla Mcguire RN)  Effect of psychiatric condition will be minimized and patient will be protected from self harm: Provide emotional support, presence and reassurance  Note: States has some depression but states it is a lot better since admission. Rates his mood an 8 with 10 being the best. Affect bright. Good eye contact. States he is hopeful for the future. Good peer interaction. Denies suicidal ideations this shift. Problem: Coping  Goal: Pt/Family able to verbalize concerns and demonstrate effective coping strategies  Description: INTERVENTIONS:  1. Assist patient/family to identify coping skills, available support systems and cultural and spiritual values  2. Provide emotional support, including active listening and acknowledgement of concerns of patient and caregivers  3. Reduce environmental stimuli, as able  4. Instruct patient/family in relaxation techniques, as appropriate  5.  Assess for spiritual pain/suffering and initiate Spiritual Care, Psychosocial Clinical Specialist consults as needed  2/28/2023 1020 by Kiki Piper LPN  Outcome: Progressing  Note: Patient did not verbalize any concerns and did demonstrate effective coping strategies  2/28/2023 0042 by Chio Vazquez RN  Outcome: Progressing  Flowsheets (Taken 2/28/2023 7076)  Patient/family able to verbalize anxieties, fears, and concerns, and demonstrate effective coping: Provide emotional support, including active listening and acknowledgement of concerns of patient and caregivers     Care plan reviewed with patient . Patient  verbalizes understanding of the plan of care and contributes to goal setting.

## 2023-02-28 NOTE — PROGRESS NOTES
[facility]  Day 3 Interdisciplinary Treatment Plan NOTE    Review Date & Time: 02/28/23  1455    Patient was in treatment team    Admission Type:   Admission Type: Voluntary    Reason for admission:  Reason for Admission: \"Increased depression and suicidal ideation. Abusing Cough Syryp at home. Panicked, Paranoidl, Germaphobe. \"  Estimated Length of Stay Update:  03/04/23  Estimated Discharge Date Update: 2-3 days    Patient Strengths/Barriers  Strengths (Must Choose Two): Motivation level for treatment, Sense of humor  Barriers: Education, Recreational/leisure/hobbies, Technical/vocation  Addictive Behavior:Addictive Behavior  In the Past 3 Months, Have You Felt or Has Someone Told You That You Have a Problem With  : Other (comment) (Abusing OTC per patient)  Medical Problems:No past medical history on file. Risk:  Fall Risk   Casey Scale Casey Scale Score: 22    Status EXAM:   Mental Status and Behavioral Exam  Normal: Yes  Level of Assistance: Independent/Self  Facial Expression: Brightened  Affect: Appropriate  Level of Consciousness: Alert  Frequency of Checks: 4 times per hour, close  Mood:Normal: No  Mood: Depressed, Anxious  Motor Activity:Normal: Yes  Motor Activity: Other (comment) (WNL)  Eye Contact: Good  Observed Behavior: Cooperative  Sexual Misconduct History: Current - no  Preception: [place] to person, [place] to time, [place] to place, [place] to situation  Attention:Normal: Yes  Attention: Hyperalert  Thought Processes: Other (comment) (logical)  Thought Content:Normal: Yes  Thought Content: Paranoia  Depression Symptoms: Other (comment) (states only has some depression. states alot better since admisison.)  Anxiety Symptoms: Generalized  Joy Symptoms: No problems reported or observed.   Hallucinations: Visual (comment) (states visual hallucinations of nurses station door opening by itself out of the corner of his eye and then when he looks it's closed.)  Delusions: No  Delusions: Paranoid  Memory:Normal: No  Memory: Poor recent, Confabulation  Insight and Judgment: No  Insight and Judgment: Poor judgment, Poor insight    Daily Assessment Last Entry:   Daily Sleep (WDL): Within Defined Limits            Daily Nutrition (WDL): Within Defined Limits  Level of Assistance: Independent/Self    Patient Monitoring:  Frequency of Checks: 4 times per hour, close    Psychiatric Symptoms:   Depression Symptoms  Depression Symptoms: Other (comment) (states only has some depression. states alot better since admisison.)  Anxiety Symptoms  Anxiety Symptoms: Generalized  Joy Symptoms  Joy Symptoms: No problems reported or observed. Suicide Risk CSSR-S:  1) Within the past month, have you wished you were dead or wished you could go to sleep and not wake up? : Yes  2) Have you actually had any thoughts of killing yourself? : No  6) Have you ever done anything, started to do anything, or prepared to do anything to end your life?: No    EDUCATION:   Learner Progress Toward Treatment Goals: Reviewed results and recommendations of this team, Reviewed group plan and strategies, Reviewed signs, symptoms and risk of self harm and violent behavior, and Reviewed goals and plan of care    Method: Individual    Outcome: Verbalized understanding and Demonstrated Understanding    PATIENT GOALS: Medication change    OQ TOP QUALITY PRIORITIES FOR THE PATIENT AS IDENTIFIED ON ADMISSION ADMINISTRATION:        Suicide substance abuse. PLAN/TREATMENT RECOMMENDATIONS UPDATE:  How are you progressing toward meeting your main treatment goal? Mood is improving during the day. Pt is still having unfounded fears at night with nightmares. 2.  Are there discharge barriers/lingering problems that need to be addressed? None      3. Do you have the ability to pay for your medications? Yes      4. How is your group participation?   Yes    GOALS UPDATE:   Time frame for Short-Term Goals: Daily      CECIL Rosales

## 2023-02-28 NOTE — PROGRESS NOTES
Group Therapy Note    Date: 02/27/23  Start Time: 2000  End Time:  2020  Number of Participants:     Type of Group: Wrap-Up    Wellness Binder Information  Module Name:    Session Number:      Patient's Goal:  to change clothes     Notes:  goal met     Status After Intervention:  Unchanged    Participation Level:  Active Listener and Interactive    Participation Quality: Appropriate      Speech:  normal      Thought Process/Content: Logical      Affective Functioning: Congruent      Mood: anxious and depressed      Level of consciousness:  Alert      Response to Learning: Able to verbalize current knowledge/experience, Able to verbalize/acknowledge new learning, and Able to retain information      Endings: None Reported    Modes of Intervention: Support and Socialization      Discipline Responsible: Registered Nurse      Signature:  Lawyer Jasbir RN

## 2023-02-28 NOTE — GROUP NOTE
Group Therapy Note    Date: 2/28/2023    Group Start Time: 1115  Group End Time: 8687  Group Topic: Healthy Living/Wellness    [facility] 4[facility]    Lou Johnson LPN        Group Therapy Note    Attendees: 6       Notes:  attended    Status After Intervention:  Improved    Participation Level:  Active Listener and Interactive    Participation Quality: Appropriate and Attentive      Speech:  normal      Thought Process/Content: Logical      Affective Functioning: Flat      Level of consciousness:  Alert, Oriented x4, and Attentive      Response to Learning: Able to verbalize current knowledge/experience, Able to verbalize/acknowledge new learning, Able to retain information, and Capable of insight      Endings: None Reported    Modes of Intervention: Education and Socialization      Discipline Responsible: Licensed Practical Nurse, students      Signature:  Lou Johnson LPN

## 2023-02-28 NOTE — BH NOTE
PLAN OF CARE:     Start Time: 0900  End Time:  0915    Group Topic:  Daily Goals    Group Type:   Goal Group    Intervention/Goal:  To increase support and identify daily goals    Attendance:  attended      Affect:   bright    Behavior: pleasant and cooperative    Response: appropriate    Daily Goal: \"To read more. \"    Progress:  progressing to goal

## 2023-02-28 NOTE — PLAN OF CARE
Patient has attended all of the groups so far today and has also been out of his room to socialize with others this shift so he has been able to demonstrate effective coping strategies at this time.

## 2023-02-28 NOTE — PROGRESS NOTES
Department of Psychiatry  Progress Note     Chief Complaint:  suicidal ideation, homicidal ideation, psychosis     PROGRESS:  [address] Lino Lawson, who prefers to be called Galius Marcelinodanni, presents to the interview room. He states he is doing better today. Reports he reports he has been going to the art therapy and groups which have been helpful. States his day was good yesterday. Mood is better today. He rates his overall mood 7 out of 10 with 10 being the best.  Continues to feel anxious and has been utilizing Atarax. Depression is not as bad today. He continues to have fleeting suicidal thoughts but denies having plan or intent. States yesterday he had suicidal thoughts \"bad\" a few times throughout the day. He denies any active suicidal ideation at this time and contracts for safety on the unit. He continues to feel paranoid at times. States that he still thinks that someone is going to hurt him. Reports it is not as bad today. He is feeling more safe on the unit especially when the nurses come and check on him. He reports the voices are not as bad today. He denies any command hallucinations to harm himself or others. Denies any visual hallucinations at this time. He slept okay last night. States he slept throughout the night but woke up this morning and was not able to fall back asleep. Staff reported he slept 8 hours continuous. Appetite has been good. He has been compliant with his medications and denies any side effects. He has been out the unit coloring, interacting with peers and attending groups.   He reports his dad is going to try to come visit him tomorrow    Suicidal ideations: Fleeting without current plan or intent   compliance with medications: good   Medication side effects: absent  ROS: Patient has new complaints:  no  Sleep quality: 8 hours continuous last night per staff  Attending groups: yes      OBJECTIVE      Medications  Current Facility-Administered Medications: risperiDONE (RISPERDAL) tablet 1.5 mg, 1.5 mg, Oral, BID  acetaminophen (TYLENOL) tablet 650 mg, 650 mg, Oral, Q4H PRN  ibuprofen (ADVIL;MOTRIN) tablet 400 mg, 400 mg, Oral, Q6H PRN  magnesium hydroxide (MILK OF MAGNESIA) 400 MG/5ML suspension 30 mL, 30 mL, Oral, Daily PRN  aluminum & magnesium hydroxide-simethicone (MAALOX) 200-200-20 MG/5ML suspension 30 mL, 30 mL, Oral, Q6H PRN  hydrOXYzine HCl (ATARAX) tablet 50 mg, 50 mg, Oral, TID PRN  traZODone (DESYREL) tablet 50 mg, 50 mg, Oral, Nightly PRN  prazosin (MINIPRESS) capsule 2 mg, 2 mg, Oral, Nightly  venlafaxine (EFFEXOR XR) extended release capsule 37.5 mg, 37.5 mg, Oral, Daily with breakfast     Physical     height is 5' 6\" (1.676 m) and weight is 110 lb (49.9 kg). His tympanic temperature is 97.6 °F (36.4 °C). His blood pressure is 110/72 and his pulse is 73. His respiration is 17 and oxygen saturation is 99%. No results found for: WBC, HGB, HCT, PLT, CHOL, TRIG, HDL, LDLDIRECT, ALT, AST, NA, K, CL, CREATININE, BUN, CO2, TSH, PSA, INR, GLUF, LABA1C, LABMICR       Mental Status Exam:   Level of consciousness:  awake  Appearance:  well-appearing, hospital attire, in chair, good grooming, and good hygiene  Behavior/Motor: No abnormalities noted  Attitude toward examiner:  cooperative, attentive, and good eye contact  Speech:  spontaneous, normal rate, and normal volume  Mood: Better per patient  Affect:  blunted  Thought processes:  linear, goal directed, and coherent  Thought content:  Denies homicidal ideation  Suicidal Ideation: Fleeting, without plan, and without intent  Delusions:  paranoid and persecutory  Perceptual Disturbance:  auditory noncommand.   Denies visual hallucinations   cognition: Patient is oriented to person, place, time and situation  Concentration: clinically adequate  Memory: intact  Insight & Judgement: fair       ASSESSMENT    Major depressive disorder, recurrent, severe with psychotic features  Anxiety unspecified  PTSD per history    PLAN    Patient's symptoms show minimal improvement today  Medication adjustments as discussed with the attending physician: Continue current medication regimen and observe on recent increase of Risperdal  Side effects and risks versus benefits of medications were discussed with the patient   Attempt to develop insight, psycho-education and supportive therapy conducted. Probable discharge: To be determined  Follow-up: [facility] outpatient, daily while on inpatient unit    Electronically signed by Nik Cain PA-C on 2/28/2023 at 4:34 PM Reviewed patient's current plan of care and vital signs with nursing staff. **This report has been created using voice recognition software. It may contain minor errors which are inherent in voice recognition technology. **                                        Psychiatry Attending Attestation     I assessed this patient and reviewed the case and plan of care with Nik Cain PA-C. I have reviewed the above documentation and I agree with the findings and treatment plan with the following updates. Shakeel Finn reports that his paranoid thoughts are somewhat better today. Reports that he continues to have this paranoid thoughts worse in the morning after waking up. Reports that he feels his family members around to get to him. Reports that suicidal thoughts are across his mind. Overall reports some improvement but is still unable to contract for safety outside of hospital.  Discussed with him about observing him on the recent increase of Risperdal and is agreeable to the plan. Denies any side effects with the medication. PLAN  Patient s symptoms   are improving  Will continue same medication today  Attempt to develop insight  Psycho-education conducted. Supportive Therapy conducted. Probable discharge is tbd  Follow-up tbd    More than 16 mins of the session was spent doing Supportive psychotherapy and coordinating care. Session lasted for over 30 mins.      Patient was evaluated by Olegario Rich Christopher Singh PA-C on the unit in person and I evaluated patient as Tele visit. This Virtual Visit was conducted with patient's consent. The patient is located in a state where I am licensed to provide care. Sushma Hawthorne is a 23 y.o. adult being evaluated by a Virtual Visit (video visit) encounter to address concerns as mentioned above. A caregiver was present in the room along with the patient. Patient is present at [address] [place] [address] and I am physically present at my home in [facility]   Electronically signed by Lin Sapp MD on 2/28/23 at 6:32 PM EST      An electronic signature was used to authenticate this note. **This report has been created using voice recognition software. It may contain minor errors which are inherent in voice recognition technology. **

## 2023-02-28 NOTE — PLAN OF CARE
Problem: Safety - Adult  Goal: Free from fall injury  2/28/2023 0042 by Raad Heller RN  Outcome: Progressing  Note: Remained free from falls this shift. 15 minute safety checks are being completed throughout shift. 2/27/2023 1045 by Osmany Decker RN  Outcome: Progressing  Note: No falls were observed or reported so far this shift, gait steady when ambulating and wears non-skid slippers socks. Encourage patient to wear shower shoes while in the shower. Remains on fall precautions. Problem: Pain  Goal: Verbalizes/displays adequate comfort level or baseline comfort level  2/28/2023 0042 by Raad Heller RN  Outcome: Progressing  Flowsheets (Taken 2/28/2023 3373)  Verbalizes/displays adequate comfort level or baseline comfort level: Assess pain using appropriate pain scale  Note: Denies pain this shift. 2/27/2023 1045 by Osmany Decker RN  Outcome: Progressing  Flowsheets (Taken 2/27/2023 0800)  Verbalizes/displays adequate comfort level or baseline comfort level: Encourage patient to monitor pain and request assistance  Note: Patient denies pain this shift. Problem: Genitourinary - Adult  Goal: Absence of urinary retention  2/28/2023 0042 by Raad Heller RN  Outcome: Progressing  Flowsheets (Taken 2/27/2023 1045 by Osmany Decker RN)  Absence of urinary retention: Assess patients ability to void and empty bladder  Note: No complaints of urinary retention this shift. 2/27/2023 1045 by Osmany Decker RN  Outcome: Progressing  Flowsheets  Taken 2/27/2023 1045  Absence of urinary retention: Assess patients ability to void and empty bladder  Taken 2/27/2023 0953  Absence of urinary retention: Assess patients ability to void and empty bladder  Note: No further issues reported. Problem: Anxiety  Goal: Will report anxiety at manageable levels  Description: INTERVENTIONS:  1. Administer medication as ordered  2. Teach and rehearse alternative coping skills  3.  Provide emotional support with 1:1 interaction with staff  2/28/2023 0042 by Stanley Mcknight RN  Outcome: Progressing  Flowsheets (Taken 2/28/2023 7223)  Will report anxiety at manageable levels:   Administer medication as ordered   Teach and rehearse alternative coping skills   Provide emotional support with 1:1 interaction with staff  Note: Continues to have anxiety this shift requested atarax with relief. 2/27/2023 1045 by Eddie Tai RN  Outcome: Progressing  Flowsheets (Taken 2/27/2023 1593)  Will report anxiety at manageable levels: Administer medication as ordered  Note: Patient reports anxiety. Pt taking Atarax prn. Verbalized medication was effective. Problem: Change in Body Image  Goal: Pt/Family communicate acceptance of loss or change in body image and feel psychological comfort and peace  Description: INTERVENTIONS:  1. Assess patient/family anxiety and grief process related to change in body image, loss of functional status, loss of sense of self, and forgiveness  2. Provide emotional and spiritual support  3. Provide information about the patient's health status with consideration of family and cultural values  4. Communicate willingness to discuss loss and facilitate grief process with patient/family as appropriate  5. Emphasize sustaining relationships within family system and community, or carlotta/spiritual traditions  6.  Initiate Spiritual Care, Psychosocial Clinical Specialist consult as needed  2/28/2023 0188 by Stanley Mcknight RN  Outcome: Progressing  Flowsheets (Taken 2/27/2023 1045 by Eddie Tai RN)  Patient/family communicate acceptance of loss or change in body image and feel psychological comfort and peace: Provide emotional and spiritual support  2/27/2023 1045 by Eddie Tai RN  Outcome: Progressing  Flowsheets  Taken 2/27/2023 1045  Patient/family communicate acceptance of loss or change in body image and feel psychological comfort and peace: Provide emotional and spiritual support  Taken 2/27/2023 1239  Patient/family communicate acceptance of loss or change in body image and feel psychological comfort and peace: Provide emotional and spiritual support  Note: No problems reported this shift with body image. Problem: Decision Making  Goal: Pt/Family able to effectively weigh alternatives and participate in decision making related to treatment and care  Description: INTERVENTIONS:  1. Determine when there are differences between patient's view, family's view, and healthcare provider's view of condition  2. Facilitate patient and family articulation of goals for care  3. Help patient and family identify pros/cons of alternative solutions  4. Provide information as requested by patient/family  5. Respect patient/family right to receive or not to receive information  6. Serve as a liaison between patient and family and health care team  7. Initiate Consults from Ethics, Palliative Care or initiate [address] as is appropriate  2/28/2023 0042 by Cruz Ivan RN  Outcome: Progressing  [address] (Taken 2/27/2023 6051 by Alf Ramirez, PATT)  Patient/family able to effectively weigh alternatives and participate in decision making related to treatment and care: Provide information as requested by patient/family  2/27/2023 1045 by Alf Ramirez RN  Outcome: Progressing  Flowsheets (Taken 2/27/2023 4175)  Patient/family able to effectively weigh alternatives and participate in decision making related to treatment and care: Provide information as requested by patient/family  Note: Patient participates in decision making. Problem: Behavior  Goal: Pt/Family maintain appropriate behavior and adhere to behavioral management agreement, if implemented  Description: INTERVENTIONS:  1. Assess patient/family's coping skills and  non-compliant behavior (including use of illegal substances)  2.  Notify security of behavior or suspected illegal substances which indicate the need for search of the family and/or belongings  3. Encourage verbalization of thoughts and concerns in a socially appropriate manner  4. Utilize positive, consistent limit setting strategies supporting safety of patient, staff and others  5. Encourage participation in the decision making process about the behavioral management agreement  6. If a visitor's behavior poses a threat to safety call refer to organization policy. 7. Initiate consult with , Psychosocial CNS, Spiritual Care as appropriate  2/28/2023 0042 by Juve Weaver RN  Outcome: Progressing  Flowsheets (Taken 2/27/2023 6739 by Caremla Ayala RN)  Patient/family maintains appropriate behavior and adheres to behavioral management agreement, if implemented: Assess patient/familys coping skills and  non-compliant behavior (including use of illegal substances)  2/27/2023 1045 by Carmela Ayala RN  Outcome: Progressing  Flowsheets (Taken 2/27/2023 2654)  Patient/family maintains appropriate behavior and adheres to behavioral management agreement, if implemented: Assess patient/familys coping skills and  non-compliant behavior (including use of illegal substances)  Note: Patient maintained appropriate behaviors this shift. Problem: Depression/Self Harm  Goal: Effect of psychiatric condition will be minimized and patient will be protected from self harm  Description: INTERVENTIONS:  1. Assess impact of patient's symptoms on level of functioning, self care needs and offer support as indicated  2. Assess patient/family knowledge of depression, impact on illness and need for teaching  3. Provide emotional support, presence and reassurance  4. Assess for possible suicidal thoughts or ideation. If patient expresses suicidal thoughts or statements do not leave alone, initiate Suicide Precautions, move to a room close to the nursing station and obtain sitter  5.  Initiate consults as appropriate with Mental Health Professional, Spiritual Care, Psychosocial CNS, and consider a recommendation to the [facility] for a Psychiatric Consultation  2/28/2023 0042 by Lay Dunn RN  Outcome: Progressing  Flowsheets (Taken 2/27/2023 1446 by Alesia Roman RN)  Effect of psychiatric condition will be minimized and patient will be protected from self harm: Provide emotional support, presence and reassurance  Note: States has some depression but states it is a lot better since admission. Rates his mood an 8 with 10 being the best. Affect bright. Good eye contact. States he is hopeful for the future. Good peer interaction. Denies suicidal ideations this shift. 2/27/2023 1045 by Alesia Roman RN  Outcome: Progressing  Flowsheets (Taken 2/27/2023 0260)  Effect of psychiatric condition will be minimized and patient will be protected from self harm: Provide emotional support, presence and reassurance  Note: Patient reports mood 7/10 with 10 being normal. Has blunt but brightens affect. Speech clear and relevant. Good eye contact. Reports hope for future and identifies fiance as their support system. Patient reports depression at present time. Patient reports hallucinations as \"sparkling creatures\". Reports that they are not as bad as before but that they are hiding behind walls but he still knows that they are there. Reports feeling paranoid that people are going to hurt him. Problem: Coping  Goal: Pt/Family able to verbalize concerns and demonstrate effective coping strategies  Description: INTERVENTIONS:  1. Assist patient/family to identify coping skills, available support systems and cultural and spiritual values  2. Provide emotional support, including active listening and acknowledgement of concerns of patient and caregivers  3. Reduce environmental stimuli, as able  4. Instruct patient/family in relaxation techniques, as appropriate  5.  Assess for spiritual pain/suffering and initiate Spiritual Care, Psychosocial Clinical Specialist consults as needed  2/28/2023 0042 by Atul Elena, RN  Outcome: Progressing  Flowsheets (Taken 2/28/2023 3280)  Patient/family able to verbalize anxieties, fears, and concerns, and demonstrate effective coping: Provide emotional support, including active listening and acknowledgement of concerns of patient and caregivers  2/27/2023 1445 by Florentino Mcgraw  Outcome: Progressing

## 2023-02-28 NOTE — BH NOTE
Group Therapy Note    Date: 2/28/2023  Start Time: 1000  End Time:  4049  Number of Participants: 7    Type of Group: Recreational    Patient's Goal:  Increase self-esteem and knowledge of positive coping skills. Notes:  Patient participated in a group cooperative game activity and was able to share with others in the group.      Status After Intervention:  Improved    Participation Level: Interactive    Participation Quality: Appropriate, Attentive, and Sharing      Speech:  normal      Thought Process/Content: Logical      Affective Functioning: Congruent      Mood: euthymic      Level of consciousness:  Oriented x4      Response to Learning: Progressing to goal      Endings: None Reported    Modes of Intervention: Education, Support, Socialization, Exploration, and Activity      Discipline Responsible: Psychoeducational Specialist      Signature:  Mary Jane Dang

## 2023-02-28 NOTE — PROGRESS NOTES
Psychotherapy group 1400-Pt was sleeping and did not wake when I called pts name. Pt did not attend.

## 2023-03-01 PROCEDURE — 6370000000 HC RX 637 (ALT 250 FOR IP): Performed by: PHYSICIAN ASSISTANT

## 2023-03-01 PROCEDURE — 6370000000 HC RX 637 (ALT 250 FOR IP): Performed by: PSYCHIATRY & NEUROLOGY

## 2023-03-01 PROCEDURE — 1240000000 HC EMOTIONAL WELLNESS R&B

## 2023-03-01 PROCEDURE — APPSS30 APP SPLIT SHARED TIME 16-30 MINUTES: Performed by: PHYSICIAN ASSISTANT

## 2023-03-01 RX ADMIN — HYDROXYZINE HYDROCHLORIDE 50 MG: 25 TABLET ORAL at 21:00

## 2023-03-01 RX ADMIN — VENLAFAXINE HYDROCHLORIDE 37.5 MG: 37.5 CAPSULE, EXTENDED RELEASE ORAL at 08:56

## 2023-03-01 RX ADMIN — PRAZOSIN HYDROCHLORIDE 2 MG: 1 CAPSULE ORAL at 20:59

## 2023-03-01 RX ADMIN — TRAZODONE HYDROCHLORIDE 50 MG: 50 TABLET ORAL at 20:59

## 2023-03-01 RX ADMIN — RISPERIDONE 1.5 MG: 0.25 TABLET, FILM COATED ORAL at 20:59

## 2023-03-01 RX ADMIN — RISPERIDONE 1.5 MG: 0.25 TABLET, FILM COATED ORAL at 08:56

## 2023-03-01 ASSESSMENT — PAIN SCALES - GENERAL
PAINLEVEL_OUTOF10: 0
PAINLEVEL_OUTOF10: 0

## 2023-03-01 NOTE — PLAN OF CARE
Problem: Safety - Adult  Goal: Free from fall injury  3/1/2023 1028 by Mery Aldrich LPN  Outcome: Progressing  Flowsheets (Taken 3/1/2023 0110 by Rose Mary Sharif RN)  Free From Fall Injury: Instruct family/caregiver on patient safety  3/1/2023 0110 by Rose Mary Sharif RN  Outcome: Progressing  Flowsheets (Taken 3/1/2023 0110)  Free From Fall Injury: Instruct family/caregiver on patient safety  Note: No falls noted so far this shift. Gait steady with ambulation. Problem: Pain  Goal: Verbalizes/displays adequate comfort level or baseline comfort level  3/1/2023 1028 by Mery Aldrich LPN  Outcome: Progressing  Flowsheets (Taken 3/1/2023 0954)  Verbalizes/displays adequate comfort level or baseline comfort level:   Encourage patient to monitor pain and request assistance   Assess pain using appropriate pain scale   Administer analgesics based on type and severity of pain and evaluate response  3/1/2023 0110 by Rose Mary Sharif RN  Outcome: Progressing  Flowsheets (Taken 3/1/2023 0110)  Verbalizes/displays adequate comfort level or baseline comfort level: Assess pain using appropriate pain scale  Note: Patient denies any pain at present. Problem: Genitourinary - Adult  Goal: Absence of urinary retention  3/1/2023 1028 by Mery Aldrich LPN  Outcome: Progressing  Flowsheets (Taken 3/1/2023 0955)  Absence of urinary retention: Assess patients ability to void and empty bladder  3/1/2023 0110 by Rose Mary Sharif RN  Outcome: Progressing  Flowsheets  Taken 3/1/2023 0110  Absence of urinary retention: Assess patients ability to void and empty bladder  Taken 3/1/2023 0102  Absence of urinary retention: Assess patients ability to void and empty bladder  Note: Patient denies any issues this shift. Problem: Anxiety  Goal: Will report anxiety at manageable levels  Description: INTERVENTIONS:  1. Administer medication as ordered  2. Teach and rehearse alternative coping skills  3.  Provide emotional support with 1:1 interaction with staff  3/1/2023 1028 by Yesi Barrera LPN  Outcome: Progressing  Flowsheets (Taken 3/1/2023 0955)  Will report anxiety at manageable levels:   Administer medication as ordered   Teach and rehearse alternative coping skills   Provide emotional support with 1:1 interaction with staff  3/1/2023 0110 by Destini Quinn RN  Outcome: Progressing  Flowsheets  Taken 3/1/2023 0110  Will report anxiety at manageable levels: Administer medication as ordered  Taken 3/1/2023 0102  Will report anxiety at manageable levels: Administer medication as ordered  Note: Patient states he continues to feel moderately anxious this shift. Problem: Change in Body Image  Goal: Pt/Family communicate acceptance of loss or change in body image and feel psychological comfort and peace  Description: INTERVENTIONS:  1. Assess patient/family anxiety and grief process related to change in body image, loss of functional status, loss of sense of self, and forgiveness  2. Provide emotional and spiritual support  3. Provide information about the patient's health status with consideration of family and cultural values  4. Communicate willingness to discuss loss and facilitate grief process with patient/family as appropriate  5. Emphasize sustaining relationships within family system and community, or carlotta/spiritual traditions  6.  Initiate Spiritual Care, Psychosocial Clinical Specialist consult as needed  3/1/2023 1028 by Yesi Barrera LPN  Outcome: Progressing  Flowsheets (Taken 3/1/2023 4645)  Patient/family communicate acceptance of loss or change in body image and feel psychological comfort and peace:   Assess patient/family anxiety and grief process related to change in body image, loss of functional status, loss of sense of self, and forgiveness   Provide emotional and spiritual support   Provide information about the patients health status with consideration of family and cultural values  3/1/2023 0110 by Jovita Emanuel RN  Outcome: Progressing  Flowsheets  Taken 3/1/2023 0110  Patient/family communicate acceptance of loss or change in body image and feel psychological comfort and peace: Provide emotional and spiritual support  Taken 3/1/2023 0102  Patient/family communicate acceptance of loss or change in body image and feel psychological comfort and peace: Provide emotional and spiritual support  Note: Ongoing. Problem: Decision Making  Goal: Pt/Family able to effectively weigh alternatives and participate in decision making related to treatment and care  Description: INTERVENTIONS:  1. Determine when there are differences between patient's view, family's view, and healthcare provider's view of condition  2. Facilitate patient and family articulation of goals for care  3. Help patient and family identify pros/cons of alternative solutions  4. Provide information as requested by patient/family  5. Respect patient/family right to receive or not to receive information  6. Serve as a liaison between patient and family and health care team  7.  Initiate Consults from Ethics, Palliative Care or initiate [address] [facility] as is appropriate  3/1/2023 1028 by Terence Chi LPN  Outcome: Progressing  Flowsheets (Taken 3/1/2023 2180)  Patient/family able to effectively weigh alternatives and participate in decision making related to treatment and care:   Determine when there are differences between patient's view, family's view, and healthcare provider's view of condition   Facilitate patient and family articulation of goals for care   Help patient and family identify pros/cons of alternative solutions  3/1/2023 0110 by Jovita Emanuel, RN  Outcome: Progressing  Flowsheets  Taken 3/1/2023 0110  Patient/family able to effectively weigh alternatives and participate in decision making related to treatment and care: Provide information as requested by patient/family  Taken 3/1/2023 0102  Patient/family able to effectively weigh alternatives and participate in decision making related to treatment and care: Provide information as requested by patient/family  Note: Patient verbalizes fair understanding. Problem: Behavior  Goal: Pt/Family maintain appropriate behavior and adhere to behavioral management agreement, if implemented  Description: INTERVENTIONS:  1. Assess patient/family's coping skills and  non-compliant behavior (including use of illegal substances)  2. Notify security of behavior or suspected illegal substances which indicate the need for search of the family and/or belongings  3. Encourage verbalization of thoughts and concerns in a socially appropriate manner  4. Utilize positive, consistent limit setting strategies supporting safety of patient, staff and others  5. Encourage participation in the decision making process about the behavioral management agreement  6. If a visitor's behavior poses a threat to safety call refer to organization policy.   7. Initiate consult with , Psychosocial CNS, Spiritual Care as appropriate  3/1/2023 1028 by Jaleesa Andersen LPN  Outcome: Progressing  Flowsheets (Taken 3/1/2023 0955)  Patient/family maintains appropriate behavior and adheres to behavioral management agreement, if implemented:   Assess patient/familys coping skills and  non-compliant behavior (including use of illegal substances)   Notify security of behavior or suspected illegal substances which indicate the need for search of the patient and/or belongings   Encourage verbalization of thoughts and concerns in a socially appropriate manner  3/1/2023 0110 by Lashae Marquis RN  Outcome: Progressing  Flowsheets  Taken 3/1/2023 0110  Patient/family maintains appropriate behavior and adheres to behavioral management agreement, if implemented: Encourage verbalization of thoughts and concerns in a socially appropriate manner  Taken 3/1/2023 0102  Patient/family maintains appropriate behavior and adheres to behavioral management agreement, if implemented: Assess patient/familys coping skills and  non-compliant behavior (including use of illegal substances)  Note: Patient noted with appropriate behavior this shift. Problem: Depression/Self Harm  Goal: Effect of psychiatric condition will be minimized and patient will be protected from self harm  Description: INTERVENTIONS:  1. Assess impact of patient's symptoms on level of functioning, self care needs and offer support as indicated  2. Assess patient/family knowledge of depression, impact on illness and need for teaching  3. Provide emotional support, presence and reassurance  4. Assess for possible suicidal thoughts or ideation. If patient expresses suicidal thoughts or statements do not leave alone, initiate Suicide Precautions, move to a room close to the nursing station and obtain sitter  5.  Initiate consults as appropriate with Mental Health Professional, Spiritual Care, Psychosocial CNS, and consider a recommendation to the LIP for a Psychiatric Consultation  3/1/2023 1028 by Terence Chi LPN  Outcome: Progressing  Flowsheets  Taken 3/1/2023 1027  Effect of psychiatric condition will be minimized and patient will be protected from self harm:   Assess impact of patients symptoms on level of functioning, self care needs and offer support as indicated   Assess patient/family knowledge of depression, impact on illness and need for teaching   Provide emotional support, presence and reassurance  Taken 3/1/2023 0955  Effect of psychiatric condition will be minimized and patient will be protected from self harm:   Assess impact of patients symptoms on level of functioning, self care needs and offer support as indicated   Assess patient/family knowledge of depression, impact on illness and need for teaching   Provide emotional support, presence and reassurance  3/1/2023 0110 by Jovita Emanuel RN  Outcome: Progressing  Flowsheets  Taken 3/1/2023 0110  Effect of psychiatric condition will be minimized and patient will be protected from self harm: Provide emotional support, presence and reassurance  Taken 3/1/2023 0108  Effect of psychiatric condition will be minimized and patient will be protected from self harm: Provide emotional support, presence and reassurance  Taken 3/1/2023 0102  Effect of psychiatric condition will be minimized and patient will be protected from self harm: Provide emotional support, presence and reassurance  Note: Patient denies any feelings of depression this shift. No self harm noted so far this shift. Problem: Coping  Goal: Pt/Family able to verbalize concerns and demonstrate effective coping strategies  Description: INTERVENTIONS:  1. Assist patient/family to identify coping skills, available support systems and cultural and spiritual values  2. Provide emotional support, including active listening and acknowledgement of concerns of patient and caregivers  3. Reduce environmental stimuli, as able  4. Instruct patient/family in relaxation techniques, as appropriate  5.  Assess for spiritual pain/suffering and initiate Spiritual Care, Psychosocial Clinical Specialist consults as needed  3/1/2023 1028 by Brian Liao LPN  Outcome: Progressing  Flowsheets (Taken 3/1/2023 0955)  Patient/family able to verbalize anxieties, fears, and concerns, and demonstrate effective coping:   Assist patient/family to identify coping skills, available support systems and cultural and spiritual values   Provide emotional support, including active listening and acknowledgement of concerns of patient and caregivers   Reduce environmental stimuli, as able  3/1/2023 0110 by Isaías Hernandes RN  Outcome: Progressing  Flowsheets  Taken 3/1/2023 0110  Patient/family able to verbalize anxieties, fears, and concerns, and demonstrate effective coping: Reduce environmental stimuli, as able  Taken 3/1/2023 0102  Patient/family able to verbalize anxieties, fears, and concerns, and demonstrate effective coping: Reduce environmental stimuli, as able  Note: Patient was able to verbalize needs and concerns appropriately. Care plan reviewed with patient. Patient verbalize understanding of the plan of care and contribute to goal setting.

## 2023-03-01 NOTE — BH NOTE
Group Therapy Note    Date: 3/1/2023  Start Time: 1000  End Time:  1507  Number of Participants: 7    Type of Group: Recreational    Patient's Goal:  Increase knowledge of positive coping skills. Notes:  Patient participated in coping skills pictionary and was able to identify multiple positive coping skills and negative coping skills.     Status After Intervention:  Improved    Participation Level: Interactive    Participation Quality: Appropriate, Attentive, and Sharing      Speech:  normal      Thought Process/Content: Logical      Affective Functioning: Congruent      Mood: euthymic      Level of consciousness:  Oriented x4      Response to Learning: Progressing to goal      Endings: None Reported    Modes of Intervention: Education, Support, Socialization, Exploration, and Activity      Discipline Responsible: Psychoeducational Specialist      Signature:  Adrienne Huang

## 2023-03-01 NOTE — PLAN OF CARE
Patient has attended all of the groups today and has also been out of his room to socialize with others this shift so he has been able to demonstrate effective coping skills at this time.

## 2023-03-01 NOTE — PROGRESS NOTES
Department of Psychiatry  Progress Note     Chief Complaint:  suicidal ideation, homicidal ideation, psychosis     PROGRESS:  [facility], who prefers to be called Mendoza Phipps, presents to the interview room. He states he is doing good today. He appears brighter on examination. States his day was good yesterday. Mood is pretty good today. He rates his overall mood 7 out of 10 with 10 being the best.  States his depression is \"well. \"  He continues to feel anxious at times but has been utilizing Atarax. He denies any active suicidal ideation at this time and contracts for safety on the unit. He continues to feel paranoid at times. States that he still thinks that someone is going to hurt him. Reports he experienced that feeling last night when he woke up early in the morning. He has not felt that way since he has been up and about on the unit today. He is feeling more safe on the unit today. He denies any auditory hallucinations so far today. Denies any visual hallucinations at this time. Reports last night he was seeing spiders on the wall and out of the corner of his eye. He slept okay last night. States he slept throughout the night but woke up early this morning and had trouble falling back asleep. Staff reported he slept 8.5 hours continuous. Appetite has been good. He has been compliant with his medications and denies any side effects. He has been out the unit coloring, interacting with peers and attending groups. He reports his dad is supposed to come and visit him today. He is looking forward to this.     Suicidal ideations: Denies active suicidal ideation  compliance with medications: good   Medication side effects: absent  ROS: Patient has new complaints:  no  Sleep quality: 8.5 hours continuous last night per staff  Attending groups: yes      OBJECTIVE      Medications  Current Facility-Administered Medications: risperiDONE (RISPERDAL) tablet 1.5 mg, 1.5 mg, Oral, BID  acetaminophen (TYLENOL) tablet 650 mg, 650 mg, Oral, Q4H PRN  ibuprofen (ADVIL;MOTRIN) tablet 400 mg, 400 mg, Oral, Q6H PRN  magnesium hydroxide (MILK OF MAGNESIA) 400 MG/5ML suspension 30 mL, 30 mL, Oral, Daily PRN  aluminum & magnesium hydroxide-simethicone (MAALOX) 200-200-20 MG/5ML suspension 30 mL, 30 mL, Oral, Q6H PRN  hydrOXYzine HCl (ATARAX) tablet 50 mg, 50 mg, Oral, TID PRN  traZODone (DESYREL) tablet 50 mg, 50 mg, Oral, Nightly PRN  prazosin (MINIPRESS) capsule 2 mg, 2 mg, Oral, Nightly  venlafaxine (EFFEXOR XR) extended release capsule 37.5 mg, 37.5 mg, Oral, Daily with breakfast     Physical     height is 5' 6\" (1.676 m) and weight is 110 lb (49.9 kg). His oral temperature is 98.1 °F (36.7 °C). His blood pressure is 127/86 and his pulse is 82. His respiration is 16 and oxygen saturation is 96%. No results found for: WBC, HGB, HCT, PLT, CHOL, TRIG, HDL, LDLDIRECT, ALT, AST, NA, K, CL, CREATININE, BUN, CO2, TSH, PSA, INR, GLUF, LABA1C, LABMICR       Mental Status Exam:   Level of consciousness:  awake  Appearance:  well-appearing, hospital attire, in chair, good grooming, and good hygiene  Behavior/Motor: No abnormalities noted  Attitude toward examiner:  cooperative, attentive, and good eye contact  Speech:  spontaneous, normal rate, and normal volume  Mood: \"Pretty good\" per patient  Affect: Reactive  Thought processes:  linear, goal directed, and coherent  Thought content:  Denies homicidal ideation  Suicidal Ideation: Denies active suicide ideation  delusions:  paranoid but less  Perceptual Disturbance: Denies auditory hallucinations.   Reported having visual hallucinations of spiders last night  cognition: Patient is oriented to person, place, time and situation  Concentration: clinically adequate  Memory: intact  Insight & Judgement: fair       ASSESSMENT    Major depressive disorder, recurrent, severe with psychotic features  Anxiety unspecified  PTSD per history    PLAN    Patient's symptoms show minimal improvement today  Medication adjustments as discussed with the attending physician: Continue current medication regimen and observe on recent increase of Risperdal  Side effects and risks versus benefits of medications were discussed with the patient   Attempt to develop insight, psycho-education and supportive therapy conducted. Probable discharge: Tomorrow  Follow-up: [facility] outpatient, daily while on inpatient unit    Electronically signed by Alf Chester PA-C on 3/1/2023 at 3:26 PM Reviewed patient's current plan of care and vital signs with nursing staff. **This report has been created using voice recognition software. It may contain minor errors which are inherent in voice recognition technology. **                                        Psychiatry Attending Attestation     I assessed this patient and reviewed the case and plan of care with Alf Chester PA-C. I have reviewed the above documentation and I agree with the findings and treatment plan with the following updates. Patient feels better than before. Mood and affect are better. Patient reports fleeting suicidal thoughts with no intent or plan. Patient notes that these thoughts are occurring less frequently. Denies any homicidal thoughts, that was explored with the patient. Oriented to time place and person. Recent and remote memory is intact. Patient feels hopeful. Sleep and appetite is good. No side effect from medication reported. Side-effect of medication were discussed with the patient . Patient is responding to current treatment. Discharge soon, if patient continues to show improvement. Case discussed with the staff. Patient was that his paranoia is less frequent now. Able to contract. This time. Has some anticipatory anxiety about not having insurance and about feeling his medications. Discussed with him about the [facility] and he felt reassured.       PLAN  Patient s symptoms   are improving  Will continue same medication today  Attempt to develop insight  Psycho-education conducted. Supportive Therapy conducted. Probable discharge is tomorrow  Follow-up tbd    More than 16 mins of the session was spent doing Supportive psychotherapy and coordinating care. Session lasted for over 30 mins. Patient was evaluated by Mariana Armendariz PA-C on the unit in person and I evaluated patient as Tele visit. This Virtual Visit was conducted with patient's consent. The patient is located in a state where I am licensed to provide care. Varun Diaz is a 23 y.o. adult being evaluated by a Virtual Visit (video visit) encounter to address concerns as mentioned above. A caregiver was present in the room along with the patient. Patient is present at [address] [place] [address] [address] [place] [address] and I am physically present at my home in [facility]     Electronically signed by Luis Shirley MD on 3/1/23 at 7:45 PM EST        An electronic signature was used to authenticate this note. **This report has been created using voice recognition software. It may contain minor errors which are inherent in voice recognition technology. **

## 2023-03-01 NOTE — BH NOTE
Group Therapy Note    Date: 2/28/2023  Start Time: 2000  End Time:  2020  Number of Participants: 1    Type of Group: Wrap-Up/Relaxation    Wellness Binder Information  Module Name:  None  Session Number:  None    Patient's Goal: To read    Notes:  Met    Status After Intervention:  Improved    Participation Level: Interactive    Participation Quality: Appropriate      Speech:  normal      Thought Process/Content: Paranoid      Affective Functioning: Congruent      Mood: anxious      Level of consciousness:  Oriented x4      Response to Learning: Capable of insight      Endings: None Reported    Modes of Intervention: Education      Discipline Responsible: Registered Nurse      Signature:   Candida Dye RN

## 2023-03-01 NOTE — PLAN OF CARE
Problem: Safety - Adult  Goal: Free from fall injury  Outcome: Progressing  Flowsheets (Taken 3/1/2023 0110)  Free From Fall Injury: Instruct family/caregiver on patient safety  Note: No falls noted so far this shift. Gait steady with ambulation. Problem: Pain  Goal: Verbalizes/displays adequate comfort level or baseline comfort level  Outcome: Progressing  Flowsheets (Taken 3/1/2023 0110)  Verbalizes/displays adequate comfort level or baseline comfort level: Assess pain using appropriate pain scale  Note: Patient denies any pain at present. Problem: Genitourinary - Adult  Goal: Absence of urinary retention  Outcome: Progressing  Flowsheets  Taken 3/1/2023 0110  Absence of urinary retention: Assess patients ability to void and empty bladder  Taken 3/1/2023 0102  Absence of urinary retention: Assess patients ability to void and empty bladder  Note: Patient denies any issues this shift. Problem: Anxiety  Goal: Will report anxiety at manageable levels  Description: INTERVENTIONS:  1. Administer medication as ordered  2. Teach and rehearse alternative coping skills  3. Provide emotional support with 1:1 interaction with staff  Outcome: Progressing  Flowsheets  Taken 3/1/2023 0110  Will report anxiety at manageable levels: Administer medication as ordered  Taken 3/1/2023 0102  Will report anxiety at manageable levels: Administer medication as ordered  Note: Patient states he continues to feel moderately anxious this shift. Problem: Change in Body Image  Goal: Pt/Family communicate acceptance of loss or change in body image and feel psychological comfort and peace  Description: INTERVENTIONS:  1. Assess patient/family anxiety and grief process related to change in body image, loss of functional status, loss of sense of self, and forgiveness  2. Provide emotional and spiritual support  3. Provide information about the patient's health status with consideration of family and cultural values  4. Communicate willingness to discuss loss and facilitate grief process with patient/family as appropriate  5. Emphasize sustaining relationships within family system and community, or carlotta/spiritual traditions  6. Initiate Spiritual Care, Psychosocial Clinical Specialist consult as needed  Outcome: Progressing  Flowsheets  Taken 3/1/2023 0110  Patient/family communicate acceptance of loss or change in body image and feel psychological comfort and peace: Provide emotional and spiritual support  Taken 3/1/2023 0102  Patient/family communicate acceptance of loss or change in body image and feel psychological comfort and peace: Provide emotional and spiritual support  Note: Ongoing. Problem: Decision Making  Goal: Pt/Family able to effectively weigh alternatives and participate in decision making related to treatment and care  Description: INTERVENTIONS:  1. Determine when there are differences between patient's view, family's view, and healthcare provider's view of condition  2. Facilitate patient and family articulation of goals for care  3. Help patient and family identify pros/cons of alternative solutions  4. Provide information as requested by patient/family  5. Respect patient/family right to receive or not to receive information  6. Serve as a liaison between patient and family and health care team  7. Initiate Consults from Ethics, Palliative Care or initiate [address] [place] [address] as is appropriate  Outcome: Progressing  Flowsheets  Taken 3/1/2023 0110  Patient/family able to effectively weigh alternatives and participate in decision making related to treatment and care: Provide information as requested by patient/family  Taken 3/1/2023 0102  Patient/family able to effectively weigh alternatives and participate in decision making related to treatment and care: Provide information as requested by patient/family  Note: Patient verbalizes fair understanding.      Problem: Behavior  Goal: Pt/Family maintain appropriate behavior and adhere to behavioral management agreement, if implemented  Description: INTERVENTIONS:  1. Assess patient/family's coping skills and  non-compliant behavior (including use of illegal substances)  2. Notify security of behavior or suspected illegal substances which indicate the need for search of the family and/or belongings  3. Encourage verbalization of thoughts and concerns in a socially appropriate manner  4. Utilize positive, consistent limit setting strategies supporting safety of patient, staff and others  5. Encourage participation in the decision making process about the behavioral management agreement  6. If a visitor's behavior poses a threat to safety call refer to organization policy. 7. Initiate consult with , Psychosocial CNS, Spiritual Care as appropriate  Outcome: Progressing  Flowsheets  Taken 3/1/2023 0110  Patient/family maintains appropriate behavior and adheres to behavioral management agreement, if implemented: Encourage verbalization of thoughts and concerns in a socially appropriate manner  Taken 3/1/2023 0102  Patient/family maintains appropriate behavior and adheres to behavioral management agreement, if implemented: Assess patient/familys coping skills and  non-compliant behavior (including use of illegal substances)  Note: Patient noted with appropriate behavior this shift. Problem: Depression/Self Harm  Goal: Effect of psychiatric condition will be minimized and patient will be protected from self harm  Description: INTERVENTIONS:  1. Assess impact of patient's symptoms on level of functioning, self care needs and offer support as indicated  2. Assess patient/family knowledge of depression, impact on illness and need for teaching  3. Provide emotional support, presence and reassurance  4. Assess for possible suicidal thoughts or ideation.  If patient expresses suicidal thoughts or statements do not leave alone, initiate Suicide Precautions, move to a room close to the nursing station and obtain sitter  5. Initiate consults as appropriate with Mental Health Professional, Spiritual Care, Psychosocial CNS, and consider a recommendation to the LIP for a Psychiatric Consultation  Outcome: Progressing  Flowsheets  Taken 3/1/2023 0110  Effect of psychiatric condition will be minimized and patient will be protected from self harm: Provide emotional support, presence and reassurance  Taken 3/1/2023 0108  Effect of psychiatric condition will be minimized and patient will be protected from self harm: Provide emotional support, presence and reassurance  Taken 3/1/2023 0102  Effect of psychiatric condition will be minimized and patient will be protected from self harm: Provide emotional support, presence and reassurance  Note: Patient denies any feelings of depression this shift. No self harm noted so far this shift. Problem: Coping  Goal: Pt/Family able to verbalize concerns and demonstrate effective coping strategies  Description: INTERVENTIONS:  1. Assist patient/family to identify coping skills, available support systems and cultural and spiritual values  2. Provide emotional support, including active listening and acknowledgement of concerns of patient and caregivers  3. Reduce environmental stimuli, as able  4. Instruct patient/family in relaxation techniques, as appropriate  5. Assess for spiritual pain/suffering and initiate Spiritual Care, Psychosocial Clinical Specialist consults as needed  3/1/2023 0110 by Diane Ny RN  Outcome: Progressing  Flowsheets  Taken 3/1/2023 0110  Patient/family able to verbalize anxieties, fears, and concerns, and demonstrate effective coping: Reduce environmental stimuli, as able  Taken 3/1/2023 0102  Patient/family able to verbalize anxieties, fears, and concerns, and demonstrate effective coping: Reduce environmental stimuli, as able  Note: Patient was able to verbalize needs and concerns appropriately.   2/28/2023 1349 by Chetna Moore  Outcome: Progressing   Care plan reviewed with patient.   Patient does verbalize understanding of the plan of care and does contribute to goal setting

## 2023-03-01 NOTE — PROGRESS NOTES
Group Therapy Note    Date: 3/1/2023  Start Time: 1330  End Time:  1430  Number of Participants: 7    Type of Group: Psychotherapy        Notes:  Pt is present for group. Peers explored the feelings guilt and shame and discussed the difference between the two feelings. Peers also explored the origin of their beliefs which lead to feelings of shame. The group was introduced to CBT concepts and discusses various tools which they can use to challenge and modify,  faulty beliefs leading to shame as well as other forms of emotional distress. Status After Intervention:  Improved    Participation Level:  Active Listener and Interactive    Participation Quality: Appropriate, Attentive, Sharing, and Supportive      Speech:  normal      Thought Process/Content: Logical  Linear      Affective Functioning: Congruent      Mood: euthymic      Level of consciousness:  Alert, Oriented x4, and Attentive      Response to Learning: Able to verbalize current knowledge/experience, Able to verbalize/acknowledge new learning, Able to retain information, Capable of insight, Able to change behavior, and Progressing to goal      Endings: None Reported    Modes of Intervention: Education, Support, Socialization, Exploration, Clarifying, Problem-solving, and Activity      Discipline Responsible: /Counselor      Signature:  CECIL Rosales

## 2023-03-01 NOTE — BH NOTE
PLAN OF CARE:     Start Time: 0900  End Time:  0915    Group Topic:  Daily Goals    Group Type:   Goal Group    Intervention/Goal:  To increase support and identify daily goals    Attendance:  attended group      Affect:   bright    Behavior:  cooperative and pleasant    Response: appropriate    Daily Goal:  \"Visit with my dad. \"    Progress:  progressing to goal

## 2023-03-01 NOTE — PROGRESS NOTES
This RN has reviewed and agrees with Derrek Cisneros LPN's data collection and has collaborated with this LPN regarding the patient's care plan.

## 2023-03-02 VITALS
TEMPERATURE: 97.5 F | BODY MASS INDEX: 17.68 KG/M2 | HEART RATE: 119 BPM | DIASTOLIC BLOOD PRESSURE: 77 MMHG | RESPIRATION RATE: 18 BRPM | SYSTOLIC BLOOD PRESSURE: 119 MMHG | WEIGHT: 110 LBS | OXYGEN SATURATION: 99 % | HEIGHT: 66 IN

## 2023-03-02 PROCEDURE — 6370000000 HC RX 637 (ALT 250 FOR IP): Performed by: PHYSICIAN ASSISTANT

## 2023-03-02 PROCEDURE — 6370000000 HC RX 637 (ALT 250 FOR IP): Performed by: PSYCHIATRY & NEUROLOGY

## 2023-03-02 PROCEDURE — 5130000000 HC BRIDGE APPOINTMENT

## 2023-03-02 RX ORDER — PRAZOSIN HYDROCHLORIDE 2 MG/1
2 CAPSULE ORAL NIGHTLY
Qty: 30 CAPSULE | Refills: 0 | Status: SHIPPED | OUTPATIENT
Start: 2023-03-02

## 2023-03-02 RX ORDER — HYDROXYZINE 50 MG/1
50 TABLET, FILM COATED ORAL 3 TIMES DAILY PRN
Qty: 90 TABLET | Refills: 0 | Status: SHIPPED | OUTPATIENT
Start: 2023-03-02 | End: 2023-04-01

## 2023-03-02 RX ORDER — VENLAFAXINE HYDROCHLORIDE 37.5 MG/1
37.5 CAPSULE, EXTENDED RELEASE ORAL
Qty: 30 CAPSULE | Refills: 0 | Status: SHIPPED | OUTPATIENT
Start: 2023-03-02

## 2023-03-02 RX ORDER — TRAZODONE HYDROCHLORIDE 50 MG/1
50 TABLET ORAL NIGHTLY PRN
Qty: 30 TABLET | Refills: 0 | Status: SHIPPED | OUTPATIENT
Start: 2023-03-02

## 2023-03-02 RX ORDER — RISPERIDONE 1 MG/1
1.5 TABLET ORAL 2 TIMES DAILY
Qty: 45 TABLET | Refills: 0 | Status: SHIPPED | OUTPATIENT
Start: 2023-03-02

## 2023-03-02 RX ADMIN — HYDROXYZINE HYDROCHLORIDE 50 MG: 25 TABLET ORAL at 11:05

## 2023-03-02 RX ADMIN — RISPERIDONE 1.5 MG: 0.25 TABLET, FILM COATED ORAL at 08:50

## 2023-03-02 RX ADMIN — VENLAFAXINE HYDROCHLORIDE 37.5 MG: 37.5 CAPSULE, EXTENDED RELEASE ORAL at 08:50

## 2023-03-02 ASSESSMENT — PAIN - FUNCTIONAL ASSESSMENT: PAIN_FUNCTIONAL_ASSESSMENT: ACTIVITIES ARE NOT PREVENTED

## 2023-03-02 ASSESSMENT — PAIN SCALES - GENERAL: PAINLEVEL_OUTOF10: 0

## 2023-03-02 NOTE — DISCHARGE SUMMARY
Provider Discharge Summary     Patient ID:  Lauren Royal  [id number]  55 y.o.  2000    Admit date: 2/26/2023    Discharge date and time: 3/2/2023  7:43 AM     Admitting Physician: Kirti Alexander MD     Discharge Physician: Alison Simmons MD    Admission Diagnoses: MDD (major depressive disorder), single episode [F32.9]    Discharge Diagnoses:      MDD (major depressive disorder), recurrent, severe, with psychosis ([facility] [address]     Patient Active Problem List   Diagnosis Code    MDD (major depressive disorder), recurrent, severe, with psychosis ([facility] [address] F33.3        Admission Condition: poor    Discharged Condition: stable    Indication for Admission: threat to self    History of Present Illnes (present tense wording is of findings from admission exam and are not necessarily indicative of current findings): The patient is a 23 y.o. adult with significant past medical history of PTSD and depression who presents with worsening depression, anxiety attacks, paranoia and active suicidal ideation. Has been struggling with Chronic PTSD symptoms isnce young age. He took prozac and wellbutrin in past. Elnor Antoinette off his medications  Hospital Course:   Upon admission, Lauren Royal was provided a safe secure environment, introduced to unit milieu. Patient participated in groups and individual therapies. Meds were adjusted as noted below. After few days of hospital care, patient began to feel improvement. Depression lifted, thoughts to harm self ceased. Sleep improved, appetite was good. On morning rounds 3/2/2023, Lauren Royal endorses feeling ready for discharge. Patient denies suicidal or homicidal ideations, denies hallucinations or delusions. Denies SE's from meds. It was decided that maximum benefit from hospital care had been achieved and patient can be discharged.      Consults:   None    Significant Diagnostic Studies: Routine labs and diagnostics    Treatments: Psychotropic medications, therapy with group, milieu, and 1:1 with nurses, social workers, TERESITA/CNP, and Attending physician. Discharge Medications:  Current Discharge Medication List        START taking these medications    Details   hydrOXYzine HCl (ATARAX) 50 MG tablet Take 1 tablet by mouth 3 times daily as needed for Anxiety  Qty: 90 tablet, Refills: 0      prazosin (MINIPRESS) 2 MG capsule Take 1 capsule by mouth nightly  Qty: 30 capsule, Refills: 0      risperiDONE (RISPERDAL) 1 MG tablet Take 1.5 tablets by mouth 2 times daily  Qty: 45 tablet, Refills: 0      traZODone (DESYREL) 50 MG tablet Take 1 tablet by mouth nightly as needed for Sleep  Qty: 30 tablet, Refills: 0      venlafaxine (EFFEXOR XR) 37.5 MG extended release capsule Take 1 capsule by mouth daily (with breakfast)  Qty: 30 capsule, Refills: 0              Core Measures statement:   Not applicable    Discharge Exam:  Level of consciousness:  Within normal limits  Appearance: Street clothes, seated, with good grooming  Behavior/Motor: No abnormalities noted  Attitude toward examiner:  Cooperative, attentive, good eye contact  Speech:  spontaneous, normal rate, normal volume and well articulated  Mood:  euthymic  Affect:  Full range  Thought processes:  linear, goal directed and coherent  Thought content:  denies homicidal ideation  Suicidal Ideation:  denies suicidal ideation  Delusions:  no evidence of delusions  Perceptual Disturbance:  denies any perceptual disturbance  Cognition:  Intact  Memory: age appropriate  Insight & Judgement: fair  Medication side effects: denies     Disposition: home    Patient Instructions: Activity: activity as tolerated  1. Patient instructed to take medications regularly and follow up with outpatient appointments.      Follow-up as scheduled with HC       Signed:    Electronically signed by Maria Luz Sanford MD on 3/2/23 at 7:43 AM EST    Time Spent on discharge is more than 35 minutes in the examination, evaluation, counseling and review of medications and discharge plan. Patient is evaluated by Dony Huynh PA-C on the unit in person and I evaluated patient as Tele visit. Gely Rowland is a 23 y.o. adult being evaluated by a Virtual Visit (video visit) encounter to address concerns as mentioned above. A caregiver was present in the room along with the patient. Patient is present at [address] [place] [address], [facility] and I am physically present at [place], [place]    --Audrey Camargo MD on 3/2/2023 at 7:43 AM    An electronic signature was used to authenticate this note. **This report has been created using voice recognition software. It may contain minor errors which are inherent in voice recognition technology. **

## 2023-03-02 NOTE — PLAN OF CARE
Problem: Safety - Adult  Goal: Free from fall injury  3/1/2023 2121 by Terence Chi LPN  Outcome: Progressing  Flowsheets (Taken 3/1/2023 0110 by Jovita Emanuel, RN)  Free From Fall Injury: Instruct family/caregiver on patient safety  3/1/2023 1028 by Terence Chi LPN  Outcome: Progressing  Flowsheets (Taken 3/1/2023 0110 by Jovita Emanuel, RN)  Free From Fall Injury: Instruct family/caregiver on patient safety     Problem: Pain  Goal: Verbalizes/displays adequate comfort level or baseline comfort level  3/1/2023 2121 by Terence Chi LPN  Outcome: Progressing  Flowsheets (Taken 3/1/2023 2026)  Verbalizes/displays adequate comfort level or baseline comfort level:   Encourage patient to monitor pain and request assistance   Assess pain using appropriate pain scale   Administer analgesics based on type and severity of pain and evaluate response  3/1/2023 1028 by Terence Chi LPN  Outcome: Progressing  Flowsheets (Taken 3/1/2023 0954)  Verbalizes/displays adequate comfort level or baseline comfort level:   Encourage patient to monitor pain and request assistance   Assess pain using appropriate pain scale   Administer analgesics based on type and severity of pain and evaluate response     Problem: Genitourinary - Adult  Goal: Absence of urinary retention  3/1/2023 2121 by Terence Chi LPN  Outcome: Progressing  Flowsheets (Taken 3/1/2023 2031)  Absence of urinary retention: Assess patients ability to void and empty bladder  3/1/2023 1028 by Terence Chi LPN  Outcome: Progressing  Flowsheets (Taken 3/1/2023 0955)  Absence of urinary retention: Assess patients ability to void and empty bladder     Problem: Anxiety  Goal: Will report anxiety at manageable levels  Description: INTERVENTIONS:  1. Administer medication as ordered  2. Teach and rehearse alternative coping skills  3.  Provide emotional support with 1:1 interaction with staff  3/1/2023 2121 by Terence Chi LPN  Outcome: Progressing  Flowsheets (Taken 3/1/2023 2031)  Will report anxiety at manageable levels:   Administer medication as ordered   Provide emotional support with 1:1 interaction with staff   Teach and rehearse alternative coping skills  3/1/2023 1028 by Devin Banks LPN  Outcome: Progressing  Flowsheets (Taken 3/1/2023 0955)  Will report anxiety at manageable levels:   Administer medication as ordered   Teach and rehearse alternative coping skills   Provide emotional support with 1:1 interaction with staff     Problem: Change in Body Image  Goal: Pt/Family communicate acceptance of loss or change in body image and feel psychological comfort and peace  Description: INTERVENTIONS:  1. Assess patient/family anxiety and grief process related to change in body image, loss of functional status, loss of sense of self, and forgiveness  2. Provide emotional and spiritual support  3. Provide information about the patient's health status with consideration of family and cultural values  4. Communicate willingness to discuss loss and facilitate grief process with patient/family as appropriate  5. Emphasize sustaining relationships within family system and community, or [place]/spiritual traditions  6.  Initiate Spiritual Care, Psychosocial Clinical Specialist consult as needed  3/1/2023 2121 by Devin Banks LPN  Outcome: Progressing  Flowsheets (Taken 3/1/2023 2031)  Patient/family communicate acceptance of loss or change in body image and feel psychological comfort and peace:   Assess patient/family anxiety and grief process related to change in body image, loss of functional status, loss of sense of self, and forgiveness   Provide emotional and spiritual support   Provide information about the patients health status with consideration of family and cultural values  3/1/2023 1028 by Devin Banks LPN  Outcome: Progressing  Flowsheets (Taken 3/1/2023 0955)  Patient/family communicate acceptance of loss or change in body image and feel psychological comfort and peace:   Assess patient/family anxiety and grief process related to change in body image, loss of functional status, loss of sense of self, and forgiveness   Provide emotional and spiritual support   Provide information about the patients health status with consideration of family and cultural values     Problem: Decision Making  Goal: Pt/Family able to effectively weigh alternatives and participate in decision making related to treatment and care  Description: INTERVENTIONS:  1. Determine when there are differences between patient's view, family's view, and healthcare provider's view of condition  2. Facilitate patient and family articulation of goals for care  3. Help patient and family identify pros/cons of alternative solutions  4. Provide information as requested by patient/family  5. Respect patient/family right to receive or not to receive information  6. Serve as a liaison between patient and family and health care team  7.  Initiate Consults from Ethics, Palliative Care or initiate [facility] as is appropriate  3/1/2023 2121 by Rhianna Alexander LPN  Outcome: Progressing  Flowsheets (Taken 3/1/2023 2031)  Patient/family able to effectively weigh alternatives and participate in decision making related to treatment and care:   Determine when there are differences between patient's view, family's view, and healthcare provider's view of condition   Facilitate patient and family articulation of goals for care   Help patient and family identify pros/cons of alternative solutions  3/1/2023 1028 by Rhianna Alexander LPN  Outcome: Progressing  Flowsheets (Taken 3/1/2023 0955)  Patient/family able to effectively weigh alternatives and participate in decision making related to treatment and care:   Determine when there are differences between patient's view, family's view, and healthcare provider's view of condition   Facilitate patient and family articulation of goals for care   Help patient and family identify pros/cons of alternative solutions     Problem: Behavior  Goal: Pt/Family maintain appropriate behavior and adhere to behavioral management agreement, if implemented  Description: INTERVENTIONS:  1. Assess patient/family's coping skills and  non-compliant behavior (including use of illegal substances)  2. Notify security of behavior or suspected illegal substances which indicate the need for search of the family and/or belongings  3. Encourage verbalization of thoughts and concerns in a socially appropriate manner  4. Utilize positive, consistent limit setting strategies supporting safety of patient, staff and others  5. Encourage participation in the decision making process about the behavioral management agreement  6. If a visitor's behavior poses a threat to safety call refer to organization policy.   7. Initiate consult with , Psychosocial CNS, Spiritual Care as appropriate  3/1/2023 2121 by Jami Teran LPN  Outcome: Progressing  Flowsheets (Taken 3/1/2023 2031)  Patient/family maintains appropriate behavior and adheres to behavioral management agreement, if implemented:   Notify security of behavior or suspected illegal substances which indicate the need for search of the patient and/or belongings   Encourage verbalization of thoughts and concerns in a socially appropriate manner   Assess patient/familys coping skills and  non-compliant behavior (including use of illegal substances)  3/1/2023 1028 by Jami Teran LPN  Outcome: Progressing  Flowsheets (Taken 3/1/2023 0955)  Patient/family maintains appropriate behavior and adheres to behavioral management agreement, if implemented:   Assess patient/familys coping skills and  non-compliant behavior (including use of illegal substances)   Notify security of behavior or suspected illegal substances which indicate the need for search of the patient and/or belongings   Encourage verbalization of thoughts and concerns in a socially appropriate manner     Problem: Depression/Self Harm  Goal: Effect of psychiatric condition will be minimized and patient will be protected from self harm  Description: INTERVENTIONS:  1. Assess impact of patient's symptoms on level of functioning, self care needs and offer support as indicated  2. Assess patient/family knowledge of depression, impact on illness and need for teaching  3. Provide emotional support, presence and reassurance  4. Assess for possible suicidal thoughts or ideation. If patient expresses suicidal thoughts or statements do not leave alone, initiate Suicide Precautions, move to a room close to the nursing station and obtain sitter  5.  Initiate consults as appropriate with Mental Health Professional, Spiritual Care, Psychosocial CNS, and consider a recommendation to the LIP for a Psychiatric Consultation  3/1/2023 2121 by Mery Aldrich LPN  Outcome: Progressing  Flowsheets  Taken 3/1/2023 2031  Effect of psychiatric condition will be minimized and patient will be protected from self harm:   Assess impact of patients symptoms on level of functioning, self care needs and offer support as indicated   Assess patient/family knowledge of depression, impact on illness and need for teaching   Provide emotional support, presence and reassurance  Taken 3/1/2023 1930  Effect of psychiatric condition will be minimized and patient will be protected from self harm: Assess impact of patients symptoms on level of functioning, self care needs and offer support as indicated  3/1/2023 1028 by Mery Aldrich LPN  Outcome: Progressing  Flowsheets  Taken 3/1/2023 1027  Effect of psychiatric condition will be minimized and patient will be protected from self harm:   Assess impact of patients symptoms on level of functioning, self care needs and offer support as indicated   Assess patient/family knowledge of depression, impact on illness and need for teaching   Provide emotional support, presence and reassurance  Taken 3/1/2023 0955  Effect of psychiatric condition will be minimized and patient will be protected from self harm:   Assess impact of patients symptoms on level of functioning, self care needs and offer support as indicated   Assess patient/family knowledge of depression, impact on illness and need for teaching   Provide emotional support, presence and reassurance     Problem: Coping  Goal: Pt/Family able to verbalize concerns and demonstrate effective coping strategies  Description: INTERVENTIONS:  1. Assist patient/family to identify coping skills, available support systems and cultural and spiritual values  2. Provide emotional support, including active listening and acknowledgement of concerns of patient and caregivers  3. Reduce environmental stimuli, as able  4. Instruct patient/family in relaxation techniques, as appropriate  5.  Assess for spiritual pain/suffering and initiate Spiritual Care, Psychosocial Clinical Specialist consults as needed  3/1/2023 2121 by Eren Adrian LPN  Outcome: Progressing  Flowsheets (Taken 3/1/2023 2031)  Patient/family able to verbalize anxieties, fears, and concerns, and demonstrate effective coping:   Assist patient/family to identify coping skills, available support systems and cultural and spiritual values   Provide emotional support, including active listening and acknowledgement of concerns of patient and caregivers   Reduce environmental stimuli, as able   Instruct patient/family in relaxation techniques, as appropriate  3/1/2023 1238 by Basilio Dye  Outcome: Progressing  3/1/2023 1028 by Eren Adrian LPN  Outcome: Progressing  Flowsheets (Taken 3/1/2023 0955)  Patient/family able to verbalize anxieties, fears, and concerns, and demonstrate effective coping:   Assist patient/family to identify coping skills, available support systems and cultural and spiritual values   Provide emotional support, including active listening and acknowledgement of concerns of patient and caregivers   Reduce environmental stimuli, as able  Care plan reviewed with patient. Patient verbalize understanding of the plan of care and contribute to goal setting.

## 2023-03-02 NOTE — PROGRESS NOTES
This RN has reviewed and agrees with Jorge Sprague LPN's data collection and has collaborated with this LPN regarding the patient's care plan.

## 2023-03-02 NOTE — PROGRESS NOTES
Behavioral Health   Discharge Note    Pt discharged with followings belongings:   Dental Appliances: None  Vision - Corrective Lenses: None  Hearing Aid: None  Jewelry: None  Body Piercings Removed: N/A  Clothing: Shirt, Undergarments, Socks, Pants, Shorts, Footwear  Other Valuables: Other (Comment), Wallet   Valuables retrieved from safe, security envelope number:  NA and returned to patient. Patient left department with Writer via Ambulation. Discharged to Home. \"An Important Message from Medicare About Your Rights\" (IMM) form photocopy original from admission and provided to pt at least 4 hours prior to discharge N/A. If pt left within 4 hours of receiving 2nd delivery of IMM, this is because pt was agreeable with hospital discharge. Patient/guardian education on aftercare instructions: Yes  Bridge appointment completed:  yes. Reviewed Discharge Instructions with patient/family/nursing facility. Patient/family verbalizes understanding and agreement with the discharge plan using the teachback method. Information faxed to NA by NA Patient/family verbalize understanding of AVS:Yes    Status EXAM upon discharge:  Mental Status and Behavioral Exam  Normal: Yes  Level of Assistance: Independent/Self  Facial Expression: Brightened  Affect: Appropriate  Level of Consciousness: Alert  Frequency of Checks: 4 times per hour, close  Mood:Normal: No  Mood: Anxious  Motor Activity:Normal: Yes  Motor Activity: Other (comment) (WNL)  Eye Contact: Good  Observed Behavior: Cooperative  Sexual Misconduct History: Current - no  Preception: [place] to person, [place] to time, [place] to place, [place] to situation  Attention:Normal: Yes  Attention: Hyperalert  Thought Processes: Circumstantial  Thought Content:Normal: Yes  Thought Content: Paranoia (Of the people here and how they look at him)  Depression Symptoms: No problems reported or observed.  (Patient Denies)  Anxiety Symptoms: Generalized  Joy Symptoms: No problems reported or observed.   Hallucinations: None (Patient Denies)  Delusions: No  Delusions: Paranoid  Memory:Normal: No  Memory: Poor recent  Insight and Judgment: No  Insight and Judgment: Poor judgment, Poor insight    Jasbir Gutierrez RN

## 2023-03-02 NOTE — PLAN OF CARE
Problem: Safety - Adult  Goal: Free from fall injury  3/2/2023 1015 by Devorah Goodwin RN  Outcome: Completed  3/1/2023 2121 by Ehsan Nelson LPN  Outcome: Progressing  Flowsheets (Taken 3/1/2023 0110 by Pamela Galindo RN)  Free From Fall Injury: Instruct family/caregiver on patient safety     Problem: Pain  Goal: Verbalizes/displays adequate comfort level or baseline comfort level  3/2/2023 1015 by Devorah Goodwin RN  Outcome: Completed  Flowsheets (Taken 3/2/2023 9071)  Verbalizes/displays adequate comfort level or baseline comfort level:   Encourage patient to monitor pain and request assistance   Assess pain using appropriate pain scale   Administer analgesics based on type and severity of pain and evaluate response  3/1/2023 2121 by Ehsan Nelson LPN  Outcome: Progressing  Flowsheets (Taken 3/1/2023 2026)  Verbalizes/displays adequate comfort level or baseline comfort level:   Encourage patient to monitor pain and request assistance   Assess pain using appropriate pain scale   Administer analgesics based on type and severity of pain and evaluate response     Problem: Genitourinary - Adult  Goal: Absence of urinary retention  3/2/2023 1015 by Devorah Goodwin RN  Outcome: Completed  Flowsheets (Taken 3/2/2023 0800)  Absence of urinary retention: Assess patients ability to void and empty bladder  3/1/2023 2121 by Ehsan Nelson LPN  Outcome: Progressing  Flowsheets (Taken 3/1/2023 2031)  Absence of urinary retention: Assess patients ability to void and empty bladder     Problem: Anxiety  Goal: Will report anxiety at manageable levels  Description: INTERVENTIONS:  1. Administer medication as ordered  2. Teach and rehearse alternative coping skills  3.  Provide emotional support with 1:1 interaction with staff  3/2/2023 1015 by Devorah Goodwin RN  Outcome: Completed  Flowsheets (Taken 3/2/2023 0800)  Will report anxiety at manageable levels:   Administer medication as ordered   Provide emotional support with 1:1 interaction with staff   Teach and rehearse alternative coping skills  3/1/2023 2121 by Rhianna Alexander LPN  Outcome: Progressing  Flowsheets (Taken 3/1/2023 2031)  Will report anxiety at manageable levels:   Administer medication as ordered   Provide emotional support with 1:1 interaction with staff   Teach and rehearse alternative coping skills     Problem: Change in Body Image  Goal: Pt/Family communicate acceptance of loss or change in body image and feel psychological comfort and peace  Description: INTERVENTIONS:  1. Assess patient/family anxiety and grief process related to change in body image, loss of functional status, loss of sense of self, and forgiveness  2. Provide emotional and spiritual support  3. Provide information about the patient's health status with consideration of family and cultural values  4. Communicate willingness to discuss loss and facilitate grief process with patient/family as appropriate  5. Emphasize sustaining relationships within family system and community, or carlotta/spiritual traditions  6.  Initiate Spiritual Care, Psychosocial Clinical Specialist consult as needed  3/2/2023 1015 by Bianca Kahn RN  Outcome: Completed  Flowsheets (Taken 3/2/2023 0800)  Patient/family communicate acceptance of loss or change in body image and feel psychological comfort and peace:   Assess patient/family anxiety and grief process related to change in body image, loss of functional status, loss of sense of self, and forgiveness   Provide information about the patients health status with consideration of family and cultural values   Provide emotional and spiritual support  3/1/2023 2121 by Rhianna Alexander LPN  Outcome: Progressing  Flowsheets (Taken 3/1/2023 2031)  Patient/family communicate acceptance of loss or change in body image and feel psychological comfort and peace:   Assess patient/family anxiety and grief process related to change in body image, loss of functional status, loss of sense of self, and forgiveness   Provide emotional and spiritual support   Provide information about the patients health status with consideration of family and cultural values     Problem: Decision Making  Goal: Pt/Family able to effectively weigh alternatives and participate in decision making related to treatment and care  Description: INTERVENTIONS:  1. Determine when there are differences between patient's view, family's view, and healthcare provider's view of condition  2. Facilitate patient and family articulation of goals for care  3. Help patient and family identify pros/cons of alternative solutions  4. Provide information as requested by patient/family  5. Respect patient/family right to receive or not to receive information  6. Serve as a liaison between patient and family and health care team  7.  Initiate Consults from Ethics, Palliative Care or initiate [facility] as is appropriate  3/2/2023 1015 by Bianca Kahn RN  Outcome: Completed  Flowsheets (Taken 3/2/2023 0800)  Patient/family able to effectively weigh alternatives and participate in decision making related to treatment and care:   Determine when there are differences between patient's view, family's view, and healthcare provider's view of condition   Facilitate patient and family articulation of goals for care  3/1/2023 2121 by Rhianna Alexander LPN  Outcome: Progressing  [employer] (Taken 3/1/2023 2031)  Patient/family able to effectively weigh alternatives and participate in decision making related to treatment and care:   Determine when there are differences between patient's view, family's view, and healthcare provider's view of condition   Facilitate patient and family articulation of goals for care   Help patient and family identify pros/cons of alternative solutions     Problem: Behavior  Goal: Pt/Family maintain appropriate behavior and adhere to behavioral management agreement, if implemented  Description: INTERVENTIONS:  1. Assess patient/family's coping skills and  non-compliant behavior (including use of illegal substances)  2. Notify security of behavior or suspected illegal substances which indicate the need for search of the family and/or belongings  3. Encourage verbalization of thoughts and concerns in a socially appropriate manner  4. Utilize positive, consistent limit setting strategies supporting safety of patient, staff and others  5. Encourage participation in the decision making process about the behavioral management agreement  6. If a visitor's behavior poses a threat to safety call refer to organization policy. 7. Initiate consult with , Psychosocial CNS, Spiritual Care as appropriate  3/2/2023 1015 by Keiry Denny RN  Outcome: Completed  Flowsheets (Taken 3/2/2023 0800)  Patient/family maintains appropriate behavior and adheres to behavioral management agreement, if implemented:   Notify security of behavior or suspected illegal substances which indicate the need for search of the patient and/or belongings   Encourage verbalization of thoughts and concerns in a socially appropriate manner  3/1/2023 2121 by Carter Stanton LPN  Outcome: Progressing  Flowsheets (Taken 3/1/2023 2031)  Patient/family maintains appropriate behavior and adheres to behavioral management agreement, if implemented:   Notify security of behavior or suspected illegal substances which indicate the need for search of the patient and/or belongings   Encourage verbalization of thoughts and concerns in a socially appropriate manner   Assess patient/familys coping skills and  non-compliant behavior (including use of illegal substances)     Problem: Depression/Self Harm  Goal: Effect of psychiatric condition will be minimized and patient will be protected from self harm  Description: INTERVENTIONS:  1. Assess impact of patient's symptoms on level of functioning, self care needs and offer support as indicated  2. Assess patient/family knowledge of depression, impact on illness and need for teaching  3. Provide emotional support, presence and reassurance  4. Assess for possible suicidal thoughts or ideation. If patient expresses suicidal thoughts or statements do not leave alone, initiate Suicide Precautions, move to a room close to the nursing station and obtain sitter  5. Initiate consults as appropriate with Mental Health Professional, Spiritual Care, Psychosocial CNS, and consider a recommendation to the LIP for a Psychiatric Consultation  3/2/2023 1015 by Jamal Smith RN  Outcome: Completed  Flowsheets (Taken 3/2/2023 0800)  Effect of psychiatric condition will be minimized and patient will be protected from self harm: Assess impact of patients symptoms on level of functioning, self care needs and offer support as indicated  3/1/2023 2121 by Cj Piña LPN  Outcome: Progressing  Flowsheets  Taken 3/1/2023 2031  Effect of psychiatric condition will be minimized and patient will be protected from self harm:   Assess impact of patients symptoms on level of functioning, self care needs and offer support as indicated   Assess patient/family knowledge of depression, impact on illness and need for teaching   Provide emotional support, presence and reassurance  Taken 3/1/2023 1930  Effect of psychiatric condition will be minimized and patient will be protected from self harm: Assess impact of patients symptoms on level of functioning, self care needs and offer support as indicated     Problem: Coping  Goal: Pt/Family able to verbalize concerns and demonstrate effective coping strategies  Description: INTERVENTIONS:  1. Assist patient/family to identify coping skills, available support systems and cultural and spiritual values  2. Provide emotional support, including active listening and acknowledgement of concerns of patient and caregivers  3. Reduce environmental stimuli, as able  4.  Instruct patient/family in relaxation techniques, as appropriate  5.  Assess for spiritual pain/suffering and initiate Spiritual Care, Psychosocial Clinical Specialist consults as needed  3/2/2023 1015 by Clemencia Pearson RN  Outcome: Completed  Flowsheets (Taken 3/2/2023 0800)  Patient/family able to verbalize anxieties, fears, and concerns, and demonstrate effective coping:   Assist patient/family to identify coping skills, available support systems and cultural and spiritual values   Provide emotional support, including active listening and acknowledgement of concerns of patient and caregivers  3/1/2023 2121 by Anabella Melo LPN  Outcome: Progressing  Flowsheets (Taken 3/1/2023 2031)  Patient/family able to verbalize anxieties, fears, and concerns, and demonstrate effective coping:   Assist patient/family to identify coping skills, available support systems and cultural and spiritual values   Provide emotional support, including active listening and acknowledgement of concerns of patient and caregivers   Reduce environmental stimuli, as able   Instruct patient/family in relaxation techniques, as appropriate

## 2023-03-02 NOTE — BH NOTE
Group Therapy Note    Date: 3/1/2023  Start Time: 0800   End Time:  0830  Type of Group: Wrap-Up    Patient's Goal:  To talk to dad and get rid of headaches     Notes:  Patients goal was met and patients mood improved.        Discipline Responsible: Licensed Practical Nurse      Signature:  Anabella Melo LPN

## 2023-03-02 NOTE — DISCHARGE INSTRUCTIONS
Keep all follow-up appointments and take medications as directed. Call the hope line if needed at :  Leavy Sandhoff, and .[facility] [phone]. Jacqueline Rodriguez [phone]. [facility] [phone]. [facility] [address] [employer] [phone]. [employer], [employer], and [employer] [phone]    Symptoms to report to your Doctor:  Depression  Inability to eat, sleep, or have a bowel movement  Increased sleepiness and lethargy  Voices in your head  Any thoughts of harming self or others    Things to avoid:  Caffeine  Alcohol  No street drugs  Over the counter medications unless Ok'd by your physician or pharmacist.  Driving or operating machinery until full effects of your medications are known. Driving or operating machinery if dizzy or drowsy from medications. Use journal as directed. Education:  Illness and medication teaching was completed. Discharge Disposition: Patient was discharged to *** and was transported by***. Patient was accompanied by***.       Information sent to next level of care:    ____Admission orders to [facility] [address]    __x__Discharge instructions    ____Behavioral Services Assessment    ____Hand off Summary    ____History and Physical    ____Last dose MAR    ____Patient transfer form    ____Other

## 2023-03-03 ENCOUNTER — TELEPHONE (OUTPATIENT)
Dept: PSYCHIATRY | Age: 23
End: 2023-03-03

## 2023-04-19 ENCOUNTER — HOSPITAL ENCOUNTER (INPATIENT)
Age: 23
LOS: 4 days | Discharge: HOME | DRG: 750 | End: 2023-04-23
Attending: PSYCHIATRY & NEUROLOGY | Admitting: PSYCHIATRY & NEUROLOGY
Payer: MEDICAID

## 2023-04-19 PROBLEM — F32.3 MAJOR DEPRESSIVE DISORDER WITH PSYCHOTIC FEATURES (HCC): Status: ACTIVE | Noted: 2023-04-19

## 2023-04-19 PROCEDURE — 1240000000 HC EMOTIONAL WELLNESS R&B

## 2023-04-19 PROCEDURE — 6370000000 HC RX 637 (ALT 250 FOR IP): Performed by: PSYCHIATRY & NEUROLOGY

## 2023-04-19 RX ORDER — LORAZEPAM 2 MG/1
2 TABLET ORAL EVERY 6 HOURS PRN
Status: DISCONTINUED | OUTPATIENT
Start: 2023-04-19 | End: 2023-04-23 | Stop reason: HOSPADM

## 2023-04-19 RX ORDER — HALOPERIDOL 5 MG/ML
5 INJECTION INTRAMUSCULAR EVERY 6 HOURS PRN
Status: DISCONTINUED | OUTPATIENT
Start: 2023-04-19 | End: 2023-04-23 | Stop reason: HOSPADM

## 2023-04-19 RX ORDER — IBUPROFEN 400 MG/1
400 TABLET ORAL EVERY 6 HOURS PRN
Status: DISCONTINUED | OUTPATIENT
Start: 2023-04-19 | End: 2023-04-23 | Stop reason: HOSPADM

## 2023-04-19 RX ORDER — HYDROXYZINE HYDROCHLORIDE 25 MG/1
50 TABLET, FILM COATED ORAL 3 TIMES DAILY PRN
Status: DISCONTINUED | OUTPATIENT
Start: 2023-04-19 | End: 2023-04-23 | Stop reason: HOSPADM

## 2023-04-19 RX ORDER — HALOPERIDOL 5 MG/1
5 TABLET ORAL EVERY 6 HOURS PRN
Status: DISCONTINUED | OUTPATIENT
Start: 2023-04-19 | End: 2023-04-23 | Stop reason: HOSPADM

## 2023-04-19 RX ORDER — RISPERIDONE 2 MG/1
3 TABLET ORAL 2 TIMES DAILY
Status: ON HOLD | COMMUNITY
Start: 2023-03-22 | End: 2023-04-23 | Stop reason: HOSPADM

## 2023-04-19 RX ORDER — ACETAMINOPHEN 325 MG/1
650 TABLET ORAL EVERY 4 HOURS PRN
Status: DISCONTINUED | OUTPATIENT
Start: 2023-04-19 | End: 2023-04-23 | Stop reason: HOSPADM

## 2023-04-19 RX ORDER — MAGNESIUM HYDROXIDE/ALUMINUM HYDROXICE/SIMETHICONE 120; 1200; 1200 MG/30ML; MG/30ML; MG/30ML
30 SUSPENSION ORAL EVERY 6 HOURS PRN
Status: DISCONTINUED | OUTPATIENT
Start: 2023-04-19 | End: 2023-04-23 | Stop reason: HOSPADM

## 2023-04-19 RX ORDER — HYDROXYZINE 50 MG/1
50 TABLET, FILM COATED ORAL EVERY 6 HOURS PRN
Status: ON HOLD | COMMUNITY
End: 2023-04-23 | Stop reason: SDUPTHER

## 2023-04-19 RX ORDER — FLUVOXAMINE MALEATE 50 MG/1
25 TABLET, COATED ORAL 2 TIMES DAILY
Status: ON HOLD | COMMUNITY
End: 2023-04-23 | Stop reason: HOSPADM

## 2023-04-19 RX ORDER — TRAZODONE HYDROCHLORIDE 50 MG/1
50 TABLET ORAL NIGHTLY PRN
Status: DISCONTINUED | OUTPATIENT
Start: 2023-04-19 | End: 2023-04-23 | Stop reason: HOSPADM

## 2023-04-19 RX ORDER — LORAZEPAM 2 MG/ML
2 INJECTION INTRAMUSCULAR EVERY 6 HOURS PRN
Status: DISCONTINUED | OUTPATIENT
Start: 2023-04-19 | End: 2023-04-23 | Stop reason: HOSPADM

## 2023-04-19 RX ADMIN — TRAZODONE HYDROCHLORIDE 50 MG: 50 TABLET ORAL at 22:13

## 2023-04-19 RX ADMIN — HYDROXYZINE HYDROCHLORIDE 50 MG: 25 TABLET, FILM COATED ORAL at 22:13

## 2023-04-19 ASSESSMENT — SLEEP AND FATIGUE QUESTIONNAIRES
DO YOU HAVE DIFFICULTY SLEEPING: NO
AVERAGE NUMBER OF SLEEP HOURS: 4
DO YOU USE A SLEEP AID: YES

## 2023-04-20 PROBLEM — F25.0 SCHIZOAFFECTIVE DISORDER, BIPOLAR TYPE (HCC): Status: ACTIVE | Noted: 2023-04-19

## 2023-04-20 PROCEDURE — 6370000000 HC RX 637 (ALT 250 FOR IP): Performed by: PSYCHIATRY & NEUROLOGY

## 2023-04-20 PROCEDURE — 1240000000 HC EMOTIONAL WELLNESS R&B

## 2023-04-20 PROCEDURE — APPSS30 APP SPLIT SHARED TIME 16-30 MINUTES: Performed by: PHYSICIAN ASSISTANT

## 2023-04-20 PROCEDURE — 6370000000 HC RX 637 (ALT 250 FOR IP): Performed by: PHYSICIAN ASSISTANT

## 2023-04-20 RX ORDER — PRAZOSIN HYDROCHLORIDE 1 MG/1
2 CAPSULE ORAL NIGHTLY
Status: DISCONTINUED | OUTPATIENT
Start: 2023-04-20 | End: 2023-04-23 | Stop reason: HOSPADM

## 2023-04-20 RX ORDER — FLUVOXAMINE MALEATE 50 MG/1
25 TABLET, COATED ORAL NIGHTLY
Status: DISCONTINUED | OUTPATIENT
Start: 2023-04-20 | End: 2023-04-23 | Stop reason: HOSPADM

## 2023-04-20 RX ORDER — RISPERIDONE 3 MG/1
3 TABLET ORAL 2 TIMES DAILY
Status: DISCONTINUED | OUTPATIENT
Start: 2023-04-20 | End: 2023-04-23 | Stop reason: HOSPADM

## 2023-04-20 RX ADMIN — HYDROXYZINE HYDROCHLORIDE 50 MG: 25 TABLET, FILM COATED ORAL at 21:29

## 2023-04-20 RX ADMIN — HALOPERIDOL 5 MG: 5 TABLET ORAL at 02:29

## 2023-04-20 RX ADMIN — PRAZOSIN HYDROCHLORIDE 2 MG: 1 CAPSULE ORAL at 21:29

## 2023-04-20 RX ADMIN — FLUVOXAMINE MALEATE 25 MG: 50 TABLET, COATED ORAL at 21:29

## 2023-04-20 RX ADMIN — RISPERIDONE 3 MG: 3 TABLET ORAL at 14:55

## 2023-04-20 RX ADMIN — TRAZODONE HYDROCHLORIDE 50 MG: 50 TABLET ORAL at 21:29

## 2023-04-20 RX ADMIN — LORAZEPAM 2 MG: 2 TABLET ORAL at 02:29

## 2023-04-20 RX ADMIN — RISPERIDONE 3 MG: 3 TABLET ORAL at 21:29

## 2023-04-20 ASSESSMENT — PAIN DESCRIPTION - DESCRIPTORS: DESCRIPTORS: ACHING

## 2023-04-20 ASSESSMENT — PAIN DESCRIPTION - LOCATION: LOCATION: HEAD

## 2023-04-20 ASSESSMENT — PAIN - FUNCTIONAL ASSESSMENT: PAIN_FUNCTIONAL_ASSESSMENT: ACTIVITIES ARE NOT PREVENTED

## 2023-04-20 ASSESSMENT — PAIN SCALES - GENERAL: PAINLEVEL_OUTOF10: 6

## 2023-04-21 PROCEDURE — 1240000000 HC EMOTIONAL WELLNESS R&B

## 2023-04-21 PROCEDURE — 6370000000 HC RX 637 (ALT 250 FOR IP): Performed by: PSYCHIATRY & NEUROLOGY

## 2023-04-21 PROCEDURE — 6370000000 HC RX 637 (ALT 250 FOR IP): Performed by: PHYSICIAN ASSISTANT

## 2023-04-21 PROCEDURE — APPSS30 APP SPLIT SHARED TIME 16-30 MINUTES: Performed by: PHYSICIAN ASSISTANT

## 2023-04-21 RX ADMIN — TRAZODONE HYDROCHLORIDE 50 MG: 50 TABLET ORAL at 21:00

## 2023-04-21 RX ADMIN — FLUVOXAMINE MALEATE 25 MG: 50 TABLET, COATED ORAL at 21:00

## 2023-04-21 RX ADMIN — IBUPROFEN 400 MG: 400 TABLET, FILM COATED ORAL at 16:05

## 2023-04-21 RX ADMIN — IBUPROFEN 400 MG: 400 TABLET, FILM COATED ORAL at 08:08

## 2023-04-21 RX ADMIN — HYDROXYZINE HYDROCHLORIDE 50 MG: 25 TABLET, FILM COATED ORAL at 19:32

## 2023-04-21 RX ADMIN — RISPERIDONE 3 MG: 3 TABLET ORAL at 08:05

## 2023-04-21 RX ADMIN — HYDROXYZINE HYDROCHLORIDE 50 MG: 25 TABLET, FILM COATED ORAL at 10:41

## 2023-04-21 RX ADMIN — PRAZOSIN HYDROCHLORIDE 2 MG: 1 CAPSULE ORAL at 21:00

## 2023-04-21 RX ADMIN — RISPERIDONE 3 MG: 3 TABLET ORAL at 21:00

## 2023-04-21 ASSESSMENT — PAIN SCALES - GENERAL
PAINLEVEL_OUTOF10: 6
PAINLEVEL_OUTOF10: 5

## 2023-04-21 ASSESSMENT — PAIN DESCRIPTION - DESCRIPTORS: DESCRIPTORS: ACHING

## 2023-04-21 ASSESSMENT — PAIN DESCRIPTION - ORIENTATION: ORIENTATION: RIGHT;LEFT

## 2023-04-21 ASSESSMENT — PAIN DESCRIPTION - LOCATION: LOCATION: BUTTOCKS

## 2023-04-22 PROCEDURE — APPSS30 APP SPLIT SHARED TIME 16-30 MINUTES: Performed by: NURSE PRACTITIONER

## 2023-04-22 PROCEDURE — 6370000000 HC RX 637 (ALT 250 FOR IP): Performed by: PSYCHIATRY & NEUROLOGY

## 2023-04-22 PROCEDURE — 1240000000 HC EMOTIONAL WELLNESS R&B

## 2023-04-22 PROCEDURE — 6370000000 HC RX 637 (ALT 250 FOR IP): Performed by: PHYSICIAN ASSISTANT

## 2023-04-22 RX ADMIN — IBUPROFEN 400 MG: 400 TABLET, FILM COATED ORAL at 06:39

## 2023-04-22 RX ADMIN — IBUPROFEN 400 MG: 400 TABLET, FILM COATED ORAL at 21:07

## 2023-04-22 RX ADMIN — HYDROXYZINE HYDROCHLORIDE 50 MG: 25 TABLET, FILM COATED ORAL at 08:52

## 2023-04-22 RX ADMIN — IBUPROFEN 400 MG: 400 TABLET, FILM COATED ORAL at 15:23

## 2023-04-22 RX ADMIN — TRAZODONE HYDROCHLORIDE 50 MG: 50 TABLET ORAL at 21:07

## 2023-04-22 RX ADMIN — RISPERIDONE 3 MG: 3 TABLET ORAL at 21:08

## 2023-04-22 RX ADMIN — HYDROXYZINE HYDROCHLORIDE 50 MG: 25 TABLET, FILM COATED ORAL at 21:07

## 2023-04-22 RX ADMIN — RISPERIDONE 3 MG: 3 TABLET ORAL at 08:50

## 2023-04-22 RX ADMIN — FLUVOXAMINE MALEATE 25 MG: 50 TABLET, COATED ORAL at 21:08

## 2023-04-22 RX ADMIN — PRAZOSIN HYDROCHLORIDE 2 MG: 1 CAPSULE ORAL at 21:08

## 2023-04-22 ASSESSMENT — PAIN DESCRIPTION - LOCATION
LOCATION: HEAD
LOCATION: BUTTOCKS
LOCATION: BUTTOCKS

## 2023-04-22 ASSESSMENT — PAIN - FUNCTIONAL ASSESSMENT
PAIN_FUNCTIONAL_ASSESSMENT: ACTIVITIES ARE NOT PREVENTED

## 2023-04-22 ASSESSMENT — PAIN SCALES - GENERAL
PAINLEVEL_OUTOF10: 3
PAINLEVEL_OUTOF10: 3
PAINLEVEL_OUTOF10: 4
PAINLEVEL_OUTOF10: 0

## 2023-04-22 ASSESSMENT — PAIN DESCRIPTION - DESCRIPTORS
DESCRIPTORS: ACHING
DESCRIPTORS: DULL

## 2023-04-22 ASSESSMENT — PAIN DESCRIPTION - ORIENTATION
ORIENTATION: ANTERIOR
ORIENTATION: MID

## 2023-04-23 VITALS
OXYGEN SATURATION: 99 % | BODY MASS INDEX: 19.29 KG/M2 | RESPIRATION RATE: 16 BRPM | HEART RATE: 97 BPM | DIASTOLIC BLOOD PRESSURE: 77 MMHG | SYSTOLIC BLOOD PRESSURE: 123 MMHG | HEIGHT: 66 IN | WEIGHT: 120 LBS | TEMPERATURE: 97.5 F

## 2023-04-23 PROCEDURE — 6370000000 HC RX 637 (ALT 250 FOR IP): Performed by: PHYSICIAN ASSISTANT

## 2023-04-23 PROCEDURE — 5130000000 HC BRIDGE APPOINTMENT

## 2023-04-23 PROCEDURE — 6370000000 HC RX 637 (ALT 250 FOR IP): Performed by: PSYCHIATRY & NEUROLOGY

## 2023-04-23 RX ORDER — FLUVOXAMINE MALEATE 25 MG
25 TABLET ORAL NIGHTLY
Qty: 30 TABLET | Refills: 0 | Status: SHIPPED | OUTPATIENT
Start: 2023-04-23

## 2023-04-23 RX ORDER — RISPERIDONE 3 MG/1
3 TABLET ORAL 2 TIMES DAILY
Qty: 60 TABLET | Refills: 0 | Status: SHIPPED | OUTPATIENT
Start: 2023-04-23

## 2023-04-23 RX ORDER — HYDROXYZINE 50 MG/1
50 TABLET, FILM COATED ORAL EVERY 6 HOURS PRN
Qty: 90 TABLET | Refills: 0 | Status: SHIPPED | OUTPATIENT
Start: 2023-04-23

## 2023-04-23 RX ORDER — PRAZOSIN HYDROCHLORIDE 2 MG/1
2 CAPSULE ORAL NIGHTLY
Qty: 30 CAPSULE | Refills: 0 | Status: SHIPPED | OUTPATIENT
Start: 2023-04-23

## 2023-04-23 RX ADMIN — RISPERIDONE 3 MG: 3 TABLET ORAL at 09:24

## 2023-04-23 RX ADMIN — HYDROXYZINE HYDROCHLORIDE 50 MG: 25 TABLET, FILM COATED ORAL at 09:24

## 2023-04-23 ASSESSMENT — PAIN SCALES - GENERAL: PAINLEVEL_OUTOF10: 0

## 2023-04-24 ENCOUNTER — TELEPHONE (OUTPATIENT)
Dept: PSYCHIATRY | Age: 23
End: 2023-04-24

## 2023-04-24 NOTE — TELEPHONE ENCOUNTER
Call to patient to evaluate if he had any questions. No answer at the number provided but a voicemail was left with contact information.

## 2023-04-24 NOTE — DISCHARGE SUMMARY
Provider Discharge Summary     Patient ID:  Terrance Yan  [id number]  6 y.o.  2000    Admit date: 4/19/2023    Discharge date and time: 4/23/2023  9:09 PM     Admitting Physician: Niya Lane MD     Discharge Physician: Niya Lane MD    Admission Diagnoses: Major depressive disorder with psychotic features [facility]) [F32.3]    Discharge Diagnoses:      Schizoaffective disorder, bipolar type [facility])     Patient Active Problem List   Diagnosis Code    MDD (major depressive disorder), recurrent, severe, with psychosis ([facility] [address] F33.3    Schizoaffective disorder, bipolar type ([facility] [address] F25.0        Admission Condition: poor    Discharged Condition: stable    Indication for Admission: threat to self    History of Present Illnes (present tense wording is of findings from admission exam and are not necessarily indicative of current findings):   Terrance Yan is a 23 y.o. adult with a history of depression, gender dysphoria and psychosis who was admitted directly from [facility] due to suicidal ideation and psychosis      Meir, who prefers to be called [facility], reports he is admitted due to \"an episode of dariel. \"  He states it started after he was running around playing in the yard and stepped on a \"kids popsicle. \"  He states after he stepped on the popsicle he saw 3 colors. He then says \"I think I have dissociative identity disorder. \"  He stated that the colors were \"Spider-Man colors\" and after that \"I felt like Spider-Man, hulk and [place]. \" He then stated that he was he has OCD because he does everything in threes. He then talked about how he feels he has schizophrenia because his brother ran away to \"[employer]\" and another place with an acronym with 3 letters. Patient then started talking about how he is convinced he has a \"bug in my butt. A dancing bug. I was swimming in the creek. \"  He stated that every time he steps on something called he feels like something is \"in my butt. \"  Patient then
